# Patient Record
Sex: MALE
[De-identification: names, ages, dates, MRNs, and addresses within clinical notes are randomized per-mention and may not be internally consistent; named-entity substitution may affect disease eponyms.]

---

## 2018-01-20 ENCOUNTER — HOSPITAL ENCOUNTER (EMERGENCY)
Dept: HOSPITAL 90 - EDH | Age: 63
Discharge: ANOTHER HOSPITAL | End: 2018-01-20
Payer: COMMERCIAL

## 2018-01-20 DIAGNOSIS — Z79.4: ICD-10-CM

## 2018-01-20 DIAGNOSIS — Z86.73: ICD-10-CM

## 2018-01-20 DIAGNOSIS — R56.9: Primary | ICD-10-CM

## 2018-01-20 DIAGNOSIS — Z88.1: ICD-10-CM

## 2018-01-20 DIAGNOSIS — E11.9: ICD-10-CM

## 2018-01-20 DIAGNOSIS — Z95.1: ICD-10-CM

## 2018-01-20 DIAGNOSIS — R27.0: ICD-10-CM

## 2018-01-20 DIAGNOSIS — Z95.810: ICD-10-CM

## 2018-01-20 DIAGNOSIS — R53.1: ICD-10-CM

## 2018-01-20 DIAGNOSIS — I25.810: ICD-10-CM

## 2018-01-20 LAB
ALBUMIN SERPL-MCNC: 2.7 G/DL (ref 3.5–5)
ALT SERPL-CCNC: 15 U/L (ref 12–78)
APTT PPP: 30.1 SEC (ref 26.3–35.5)
AST SERPL-CCNC: 23 U/L (ref 10–37)
BASOPHILS NFR BLD AUTO: 0.3 % (ref 0–5)
BILIRUB SERPL-MCNC: 0.4 MG/DL (ref 0.2–1)
BUN SERPL-MCNC: 42 MG/DL (ref 7–18)
CHLORIDE SERPL-SCNC: 96 MMOL/L (ref 101–111)
CK MB SERPL-MCNC: < 0.5 NG/ML (ref 0.5–3.6)
CK SERPL-CCNC: 229 U/L (ref 21–232)
CO2 SERPL-SCNC: 28 MMOL/L (ref 21–32)
CREAT SERPL-MCNC: 2 MG/DL (ref 0.5–1.5)
EOSINOPHIL NFR BLD AUTO: 0 % (ref 0–8)
ERYTHROCYTE [DISTWIDTH] IN BLOOD BY AUTOMATED COUNT: 14.3 % (ref 11–15.5)
GFR SERPL CREATININE-BSD FRML MDRD: 36 ML/MIN (ref 60–?)
GLUCOSE SERPL-MCNC: 137 MG/DL (ref 70–105)
HCT VFR BLD AUTO: 30.8 % (ref 42–54)
INR PPP: 0.96 (ref 0.85–1.15)
LYMPHOCYTES NFR SPEC AUTO: 5.9 % (ref 21–51)
MCH RBC QN AUTO: 31 PG (ref 27–33)
MCHC RBC AUTO-ENTMCNC: 35 G/DL (ref 32–36)
MCV RBC AUTO: 88.5 FL (ref 79–99)
MONOCYTES NFR BLD AUTO: 5.4 % (ref 3–13)
MYOGLOBIN SERPL-MCNC: 209 NG/ML (ref 10–92)
NEUTROPHILS NFR BLD AUTO: 88.4 % (ref 40–77)
NRBC BLD MANUAL-RTO: 0 % (ref 0–0.19)
PLATELET # BLD AUTO: 247 K/UL (ref 130–400)
POTASSIUM SERPL-SCNC: 3.8 MMOL/L (ref 3.5–5.1)
PROT SERPL-MCNC: 7.9 G/DL (ref 6–8.3)
PROTHROMBIN TIME: 10.1 SEC (ref 9.6–11.6)
RBC # BLD AUTO: 3.48 MIL/UL (ref 4.5–6.2)
SODIUM SERPL-SCNC: 133 MMOL/L (ref 136–145)
WBC # BLD AUTO: 17 K/UL (ref 4.8–10.8)

## 2018-01-20 PROCEDURE — 93005 ELECTROCARDIOGRAM TRACING: CPT

## 2018-01-20 PROCEDURE — 36415 COLL VENOUS BLD VENIPUNCTURE: CPT

## 2018-01-20 PROCEDURE — 85025 COMPLETE CBC W/AUTO DIFF WBC: CPT

## 2018-01-20 PROCEDURE — 80053 COMPREHEN METABOLIC PANEL: CPT

## 2018-01-20 PROCEDURE — 83874 ASSAY OF MYOGLOBIN: CPT

## 2018-01-20 PROCEDURE — 82550 ASSAY OF CK (CPK): CPT

## 2018-01-20 PROCEDURE — 85610 PROTHROMBIN TIME: CPT

## 2018-01-20 PROCEDURE — 94761 N-INVAS EAR/PLS OXIMETRY MLT: CPT

## 2018-01-20 PROCEDURE — 82553 CREATINE MB FRACTION: CPT

## 2018-01-20 PROCEDURE — 71045 X-RAY EXAM CHEST 1 VIEW: CPT

## 2018-01-20 PROCEDURE — 84484 ASSAY OF TROPONIN QUANT: CPT

## 2018-01-20 PROCEDURE — 85730 THROMBOPLASTIN TIME PARTIAL: CPT

## 2018-01-20 PROCEDURE — 70450 CT HEAD/BRAIN W/O DYE: CPT

## 2019-02-11 ENCOUNTER — HOSPITAL ENCOUNTER (OUTPATIENT)
Dept: HOSPITAL 90 - OIH | Age: 64
Discharge: HOME | End: 2019-02-11
Attending: FAMILY MEDICINE
Payer: COMMERCIAL

## 2019-02-11 DIAGNOSIS — M25.571: Primary | ICD-10-CM

## 2019-02-11 PROCEDURE — 73610 X-RAY EXAM OF ANKLE: CPT

## 2019-11-20 ENCOUNTER — HOSPITAL ENCOUNTER (OUTPATIENT)
Dept: HOSPITAL 90 - RAH | Age: 64
Discharge: HOME | End: 2019-11-20
Attending: FAMILY MEDICINE
Payer: COMMERCIAL

## 2019-11-20 DIAGNOSIS — Z98.890: ICD-10-CM

## 2019-11-20 DIAGNOSIS — I50.22: Primary | ICD-10-CM

## 2019-11-20 DIAGNOSIS — Z95.0: ICD-10-CM

## 2019-11-20 PROCEDURE — 71046 X-RAY EXAM CHEST 2 VIEWS: CPT

## 2021-04-30 ENCOUNTER — HOSPITAL ENCOUNTER (OUTPATIENT)
Dept: HOSPITAL 90 - RAH | Age: 66
Discharge: HOME | End: 2021-04-30
Attending: INTERNAL MEDICINE
Payer: COMMERCIAL

## 2021-04-30 DIAGNOSIS — I73.9: Primary | ICD-10-CM

## 2021-04-30 DIAGNOSIS — R60.0: ICD-10-CM

## 2021-04-30 PROCEDURE — 85378 FIBRIN DEGRADE SEMIQUANT: CPT

## 2021-04-30 PROCEDURE — 93970 EXTREMITY STUDY: CPT

## 2021-04-30 PROCEDURE — 36415 COLL VENOUS BLD VENIPUNCTURE: CPT

## 2021-06-07 LAB
APTT PPP: 26.2 SEC (ref 26.3–35.5)
BASOPHILS NFR BLD AUTO: 0.5 % (ref 0–5)
BUN SERPL-MCNC: 34 MG/DL (ref 7–18)
CHLORIDE SERPL-SCNC: 108 MMOL/L (ref 101–111)
CO2 SERPL-SCNC: 23 MMOL/L (ref 21–32)
CREAT SERPL-MCNC: 1.4 MG/DL (ref 0.5–1.5)
EOSINOPHIL NFR BLD AUTO: 1.9 % (ref 0–8)
ERYTHROCYTE [DISTWIDTH] IN BLOOD BY AUTOMATED COUNT: 16.6 % (ref 11–15.5)
GFR SERPL CREATININE-BSD FRML MDRD: 54 ML/MIN (ref 60–?)
GLUCOSE SERPL-MCNC: 162 MG/DL (ref 70–105)
HCT VFR BLD AUTO: 31.3 % (ref 42–54)
INR PPP: 1.03 (ref 0.85–1.15)
LYMPHOCYTES NFR SPEC AUTO: 11.2 % (ref 21–51)
MCH RBC QN AUTO: 28.4 PG (ref 27–33)
MCHC RBC AUTO-ENTMCNC: 31 G/DL (ref 32–36)
MCV RBC AUTO: 91.8 FL (ref 79–99)
MONOCYTES NFR BLD AUTO: 6.9 % (ref 3–13)
NEUTROPHILS NFR BLD AUTO: 79.1 % (ref 40–77)
NRBC BLD MANUAL-RTO: 0 % (ref 0–0.19)
PLATELET # BLD AUTO: 360 K/UL (ref 130–400)
POTASSIUM SERPL-SCNC: 4.3 MMOL/L (ref 3.5–5.1)
PROTHROMBIN TIME: 11.2 SEC (ref 9.6–11.6)
RBC # BLD AUTO: 3.41 MIL/UL (ref 4.5–6.2)
RBC MORPH BLD: (no result)
SODIUM SERPL-SCNC: 141 MMOL/L (ref 136–145)
WBC # BLD AUTO: 10.8 K/UL (ref 4.8–10.8)

## 2021-06-08 VITALS — DIASTOLIC BLOOD PRESSURE: 52 MMHG | SYSTOLIC BLOOD PRESSURE: 122 MMHG

## 2021-06-09 ENCOUNTER — HOSPITAL ENCOUNTER (OUTPATIENT)
Dept: HOSPITAL 90 - DAH | Age: 66
Setting detail: OBSERVATION
LOS: 1 days | Discharge: HOME | End: 2021-06-10
Attending: INTERNAL MEDICINE | Admitting: INTERNAL MEDICINE
Payer: COMMERCIAL

## 2021-06-09 VITALS — BODY MASS INDEX: 22.93 KG/M2 | WEIGHT: 146.1 LBS | HEIGHT: 67 IN

## 2021-06-09 VITALS — SYSTOLIC BLOOD PRESSURE: 133 MMHG | DIASTOLIC BLOOD PRESSURE: 51 MMHG

## 2021-06-09 VITALS — SYSTOLIC BLOOD PRESSURE: 152 MMHG | DIASTOLIC BLOOD PRESSURE: 62 MMHG

## 2021-06-09 VITALS — DIASTOLIC BLOOD PRESSURE: 77 MMHG | SYSTOLIC BLOOD PRESSURE: 129 MMHG

## 2021-06-09 VITALS — DIASTOLIC BLOOD PRESSURE: 65 MMHG | SYSTOLIC BLOOD PRESSURE: 141 MMHG

## 2021-06-09 VITALS — SYSTOLIC BLOOD PRESSURE: 163 MMHG | DIASTOLIC BLOOD PRESSURE: 78 MMHG

## 2021-06-09 VITALS — DIASTOLIC BLOOD PRESSURE: 58 MMHG | SYSTOLIC BLOOD PRESSURE: 150 MMHG

## 2021-06-09 VITALS — DIASTOLIC BLOOD PRESSURE: 74 MMHG | SYSTOLIC BLOOD PRESSURE: 147 MMHG

## 2021-06-09 VITALS — SYSTOLIC BLOOD PRESSURE: 146 MMHG | DIASTOLIC BLOOD PRESSURE: 62 MMHG

## 2021-06-09 VITALS — SYSTOLIC BLOOD PRESSURE: 143 MMHG | DIASTOLIC BLOOD PRESSURE: 68 MMHG

## 2021-06-09 VITALS — DIASTOLIC BLOOD PRESSURE: 63 MMHG | SYSTOLIC BLOOD PRESSURE: 145 MMHG

## 2021-06-09 VITALS — SYSTOLIC BLOOD PRESSURE: 140 MMHG | DIASTOLIC BLOOD PRESSURE: 61 MMHG

## 2021-06-09 DIAGNOSIS — I11.0: ICD-10-CM

## 2021-06-09 DIAGNOSIS — Z79.899: ICD-10-CM

## 2021-06-09 DIAGNOSIS — I25.2: ICD-10-CM

## 2021-06-09 DIAGNOSIS — I50.22: ICD-10-CM

## 2021-06-09 DIAGNOSIS — Z89.612: ICD-10-CM

## 2021-06-09 DIAGNOSIS — I25.10: ICD-10-CM

## 2021-06-09 DIAGNOSIS — Z95.5: ICD-10-CM

## 2021-06-09 DIAGNOSIS — Z95.810: ICD-10-CM

## 2021-06-09 DIAGNOSIS — E11.51: ICD-10-CM

## 2021-06-09 DIAGNOSIS — I70.201: Primary | ICD-10-CM

## 2021-06-09 DIAGNOSIS — Z88.5: ICD-10-CM

## 2021-06-09 LAB
DEPRECATED SQUAMOUS URNS QL MICRO: (no result) /HPF (ref 0–2)
PH UR STRIP: 6 [PH] (ref 5–8)
PROT UR QL STRIP: (no result) MG/DL
RBC #/AREA URNS HPF: (no result) /HPF (ref 0–1)
SP GR UR STRIP: 1.02 (ref 1–1.03)
UROBILINOGEN UR STRIP-MCNC: 4 MG/DL (ref 0.2–1)
WBC #/AREA URNS HPF: (no result) /HPF (ref 0–1)

## 2021-06-09 PROCEDURE — 96360 HYDRATION IV INFUSION INIT: CPT

## 2021-06-09 PROCEDURE — 99156 MOD SED OTH PHYS/QHP 5/>YRS: CPT

## 2021-06-09 PROCEDURE — 75710 ARTERY X-RAYS ARM/LEG: CPT

## 2021-06-09 PROCEDURE — 99157 MOD SED OTHER PHYS/QHP EA: CPT

## 2021-06-09 PROCEDURE — 80048 BASIC METABOLIC PNL TOTAL CA: CPT

## 2021-06-09 PROCEDURE — 85610 PROTHROMBIN TIME: CPT

## 2021-06-09 PROCEDURE — 85025 COMPLETE CBC W/AUTO DIFF WBC: CPT

## 2021-06-09 PROCEDURE — 93005 ELECTROCARDIOGRAM TRACING: CPT

## 2021-06-09 PROCEDURE — 85730 THROMBOPLASTIN TIME PARTIAL: CPT

## 2021-06-09 PROCEDURE — 71045 X-RAY EXAM CHEST 1 VIEW: CPT

## 2021-06-09 PROCEDURE — 85027 COMPLETE CBC AUTOMATED: CPT

## 2021-06-09 PROCEDURE — 81001 URINALYSIS AUTO W/SCOPE: CPT

## 2021-06-09 PROCEDURE — 82948 REAGENT STRIP/BLOOD GLUCOSE: CPT

## 2021-06-09 PROCEDURE — 36415 COLL VENOUS BLD VENIPUNCTURE: CPT

## 2021-06-09 PROCEDURE — 36246 INS CATH ABD/L-EXT ART 2ND: CPT

## 2021-06-09 RX ADMIN — SODIUM CHLORIDE SCH UNIT: 9 INJECTION, SOLUTION INTRAVENOUS at 21:20

## 2021-06-09 RX ADMIN — SODIUM CHLORIDE SCH UNIT: 9 INJECTION, SOLUTION INTRAVENOUS at 16:30

## 2021-06-10 VITALS — SYSTOLIC BLOOD PRESSURE: 149 MMHG | DIASTOLIC BLOOD PRESSURE: 74 MMHG

## 2021-06-10 VITALS — DIASTOLIC BLOOD PRESSURE: 66 MMHG | SYSTOLIC BLOOD PRESSURE: 137 MMHG

## 2021-06-10 VITALS — SYSTOLIC BLOOD PRESSURE: 140 MMHG | DIASTOLIC BLOOD PRESSURE: 59 MMHG

## 2021-06-10 LAB
BUN SERPL-MCNC: 18 MG/DL (ref 7–18)
CHLORIDE SERPL-SCNC: 107 MMOL/L (ref 101–111)
CO2 SERPL-SCNC: 23 MMOL/L (ref 21–32)
CREAT SERPL-MCNC: 1 MG/DL (ref 0.5–1.5)
ERYTHROCYTE [DISTWIDTH] IN BLOOD BY AUTOMATED COUNT: 15.9 % (ref 11–15.5)
GFR SERPL CREATININE-BSD FRML MDRD: 79 ML/MIN (ref 60–?)
GLUCOSE SERPL-MCNC: 131 MG/DL (ref 70–105)
HCT VFR BLD AUTO: 30.5 % (ref 42–54)
MCH RBC QN AUTO: 28.5 PG (ref 27–33)
MCHC RBC AUTO-ENTMCNC: 31.5 G/DL (ref 32–36)
MCV RBC AUTO: 90.5 FL (ref 79–99)
NRBC BLD MANUAL-RTO: 0 % (ref 0–0.19)
PLATELET # BLD AUTO: 281 K/UL (ref 130–400)
POTASSIUM SERPL-SCNC: 3.5 MMOL/L (ref 3.5–5.1)
RBC # BLD AUTO: 3.37 MIL/UL (ref 4.5–6.2)
SODIUM SERPL-SCNC: 141 MMOL/L (ref 136–145)
WBC # BLD AUTO: 9.8 K/UL (ref 4.8–10.8)

## 2021-06-10 RX ADMIN — SODIUM CHLORIDE SCH UNIT: 9 INJECTION, SOLUTION INTRAVENOUS at 05:40

## 2021-06-12 ENCOUNTER — HOSPITAL ENCOUNTER (INPATIENT)
Dept: HOSPITAL 90 - EDH | Age: 66
LOS: 8 days | Discharge: NURSING, REHAB OR LONG-TERM CARE | DRG: 252 | End: 2021-06-20
Attending: INTERNAL MEDICINE | Admitting: INTERNAL MEDICINE
Payer: COMMERCIAL

## 2021-06-12 VITALS — SYSTOLIC BLOOD PRESSURE: 170 MMHG | DIASTOLIC BLOOD PRESSURE: 72 MMHG

## 2021-06-12 VITALS — SYSTOLIC BLOOD PRESSURE: 127 MMHG | DIASTOLIC BLOOD PRESSURE: 68 MMHG

## 2021-06-12 VITALS — SYSTOLIC BLOOD PRESSURE: 147 MMHG | DIASTOLIC BLOOD PRESSURE: 61 MMHG

## 2021-06-12 VITALS — BODY MASS INDEX: 24.45 KG/M2 | WEIGHT: 155.8 LBS | HEIGHT: 67 IN

## 2021-06-12 VITALS — SYSTOLIC BLOOD PRESSURE: 145 MMHG | DIASTOLIC BLOOD PRESSURE: 55 MMHG

## 2021-06-12 DIAGNOSIS — I10: ICD-10-CM
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
## 2021-06-12 DIAGNOSIS — Z95.810: ICD-10-CM

## 2021-06-12 DIAGNOSIS — J95.2: ICD-10-CM

## 2021-06-12 DIAGNOSIS — Z95.1: ICD-10-CM
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
## 2021-06-12 DIAGNOSIS — S80.811A: ICD-10-CM
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
## 2021-06-12 DIAGNOSIS — S09.90XA: ICD-10-CM
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
## 2021-06-12 DIAGNOSIS — K21.9: ICD-10-CM

## 2021-06-12 DIAGNOSIS — L97.509: ICD-10-CM

## 2021-06-12 DIAGNOSIS — Z88.8: ICD-10-CM

## 2021-06-12 DIAGNOSIS — N28.89: ICD-10-CM

## 2021-06-12 DIAGNOSIS — Z79.01: ICD-10-CM
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
## 2021-06-12 DIAGNOSIS — I25.10: ICD-10-CM

## 2021-06-12 DIAGNOSIS — M62.82: ICD-10-CM

## 2021-06-12 DIAGNOSIS — I25.5: ICD-10-CM

## 2021-06-12 DIAGNOSIS — Z89.612: ICD-10-CM

## 2021-06-12 DIAGNOSIS — E78.00: ICD-10-CM
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
## 2021-06-12 DIAGNOSIS — J44.1: ICD-10-CM
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
## 2021-06-12 DIAGNOSIS — Z79.899: ICD-10-CM

## 2021-06-12 DIAGNOSIS — S72.002A: ICD-10-CM

## 2021-06-12 DIAGNOSIS — Z87.891: ICD-10-CM

## 2021-06-12 DIAGNOSIS — I70.201: ICD-10-CM

## 2021-06-12 DIAGNOSIS — Z88.5: ICD-10-CM

## 2021-06-12 DIAGNOSIS — E11.621: ICD-10-CM

## 2021-06-12 DIAGNOSIS — E78.5: ICD-10-CM

## 2021-06-12 DIAGNOSIS — Z79.82: ICD-10-CM

## 2021-06-12 DIAGNOSIS — R53.81: ICD-10-CM

## 2021-06-12 DIAGNOSIS — Z20.822: ICD-10-CM

## 2021-06-12 DIAGNOSIS — E11.51: Primary | ICD-10-CM

## 2021-06-12 DIAGNOSIS — Z79.02: ICD-10-CM

## 2021-06-12 DIAGNOSIS — S00.12XA: ICD-10-CM

## 2021-06-12 LAB
ALBUMIN SERPL-MCNC: 2.6 G/DL (ref 3.5–5)
ALT SERPL-CCNC: 27 U/L (ref 12–78)
AMPHETAMINES UR QL SCN: NEGATIVE
AST SERPL-CCNC: 38 U/L (ref 10–37)
BARBITURATES UR QL SCN: NEGATIVE
BASOPHILS NFR BLD AUTO: 0.4 % (ref 0–5)
BENZODIAZ UR QL SCN: NEGATIVE
BILIRUB SERPL-MCNC: 0.4 MG/DL (ref 0.2–1)
BNP SERPL-MCNC: 345 PG/ML (ref 0–100)
BUN SERPL-MCNC: 27 MG/DL (ref 7–18)
BZE UR QL SCN: NEGATIVE
CANNABINOIDS UR QL SCN: POSITIVE
CHLORIDE SERPL-SCNC: 104 MMOL/L (ref 101–111)
CK SERPL-CCNC: 578 U/L (ref 21–232)
CO2 SERPL-SCNC: 24 MMOL/L (ref 21–32)
CREAT SERPL-MCNC: 1.2 MG/DL (ref 0.5–1.5)
EOSINOPHIL NFR BLD AUTO: 1 % (ref 0–8)
ERYTHROCYTE [DISTWIDTH] IN BLOOD BY AUTOMATED COUNT: 15.8 % (ref 11–15.5)
GFR SERPL CREATININE-BSD FRML MDRD: 64 ML/MIN (ref 60–?)
GLUCOSE SERPL-MCNC: 197 MG/DL (ref 70–105)
HCT VFR BLD AUTO: 34.5 % (ref 42–54)
INR PPP: 1.01 (ref 0.85–1.15)
LYMPHOCYTES NFR SPEC AUTO: 7.5 % (ref 21–51)
MCH RBC QN AUTO: 29 PG (ref 27–33)
MCHC RBC AUTO-ENTMCNC: 31.3 G/DL (ref 32–36)
MCV RBC AUTO: 92.5 FL (ref 79–99)
MONOCYTES NFR BLD AUTO: 6.6 % (ref 3–13)
MYOGLOBIN SERPL-MCNC: 450 NG/ML (ref 10–92)
NEUTROPHILS NFR BLD AUTO: 84.1 % (ref 40–77)
NRBC BLD MANUAL-RTO: 0 % (ref 0–0.19)
OPIATES UR QL SCN: NEGATIVE
PCP UR QL SCN: NEGATIVE
PH UR STRIP: 5.5 [PH] (ref 5–8)
PLATELET # BLD AUTO: 329 K/UL (ref 130–400)
POTASSIUM SERPL-SCNC: 3.7 MMOL/L (ref 3.5–5.1)
PROT SERPL-MCNC: 7.5 G/DL (ref 6–8.3)
PROT UR QL STRIP: (no result) MG/DL
PROTHROMBIN TIME: 11 SEC (ref 9.6–11.6)
RBC # BLD AUTO: 3.73 MIL/UL (ref 4.5–6.2)
SODIUM SERPL-SCNC: 142 MMOL/L (ref 136–145)
SP GR UR STRIP: 1.03 (ref 1–1.03)
TROPONIN I SERPL-MCNC: < 0.04 NG/ML (ref 0–0.06)
UROBILINOGEN UR STRIP-MCNC: 1 MG/DL (ref 0.2–1)
WBC # BLD AUTO: 14.3 K/UL (ref 4.8–10.8)

## 2021-06-12 PROCEDURE — 87635 SARS-COV-2 COVID-19 AMP PRB: CPT

## 2021-06-12 PROCEDURE — 85027 COMPLETE CBC AUTOMATED: CPT

## 2021-06-12 PROCEDURE — 83874 ASSAY OF MYOGLOBIN: CPT

## 2021-06-12 PROCEDURE — 96360 HYDRATION IV INFUSION INIT: CPT

## 2021-06-12 PROCEDURE — 85025 COMPLETE CBC W/AUTO DIFF WBC: CPT

## 2021-06-12 PROCEDURE — 94640 AIRWAY INHALATION TREATMENT: CPT

## 2021-06-12 PROCEDURE — 83880 ASSAY OF NATRIURETIC PEPTIDE: CPT

## 2021-06-12 PROCEDURE — 85730 THROMBOPLASTIN TIME PARTIAL: CPT

## 2021-06-12 PROCEDURE — 70450 CT HEAD/BRAIN W/O DYE: CPT

## 2021-06-12 PROCEDURE — 85610 PROTHROMBIN TIME: CPT

## 2021-06-12 PROCEDURE — 97039 UNLISTED MODALITY: CPT

## 2021-06-12 PROCEDURE — 86901 BLOOD TYPING SEROLOGIC RH(D): CPT

## 2021-06-12 PROCEDURE — 92526 ORAL FUNCTION THERAPY: CPT

## 2021-06-12 PROCEDURE — 80305 DRUG TEST PRSMV DIR OPT OBS: CPT

## 2021-06-12 PROCEDURE — 86850 RBC ANTIBODY SCREEN: CPT

## 2021-06-12 PROCEDURE — 74177 CT ABD & PELVIS W/CONTRAST: CPT

## 2021-06-12 PROCEDURE — 36246 INS CATH ABD/L-EXT ART 2ND: CPT

## 2021-06-12 PROCEDURE — 86900 BLOOD TYPING SEROLOGIC ABO: CPT

## 2021-06-12 PROCEDURE — 82550 ASSAY OF CK (CPK): CPT

## 2021-06-12 PROCEDURE — 72125 CT NECK SPINE W/O DYE: CPT

## 2021-06-12 PROCEDURE — 73590 X-RAY EXAM OF LOWER LEG: CPT

## 2021-06-12 PROCEDURE — 80053 COMPREHEN METABOLIC PANEL: CPT

## 2021-06-12 PROCEDURE — 81001 URINALYSIS AUTO W/SCOPE: CPT

## 2021-06-12 PROCEDURE — 93005 ELECTROCARDIOGRAM TRACING: CPT

## 2021-06-12 PROCEDURE — 90715 TDAP VACCINE 7 YRS/> IM: CPT

## 2021-06-12 PROCEDURE — 73630 X-RAY EXAM OF FOOT: CPT

## 2021-06-12 PROCEDURE — 30233R1 TRANSFUSION OF NONAUTOLOGOUS PLATELETS INTO PERIPHERAL VEIN, PERCUTANEOUS APPROACH: ICD-10-PCS | Performed by: INTERNAL MEDICINE

## 2021-06-12 PROCEDURE — 80048 BASIC METABOLIC PNL TOTAL CA: CPT

## 2021-06-12 PROCEDURE — 87040 BLOOD CULTURE FOR BACTERIA: CPT

## 2021-06-12 PROCEDURE — 94664 DEMO&/EVAL PT USE INHALER: CPT

## 2021-06-12 PROCEDURE — 83605 ASSAY OF LACTIC ACID: CPT

## 2021-06-12 PROCEDURE — 99157 MOD SED OTHER PHYS/QHP EA: CPT

## 2021-06-12 PROCEDURE — 75710 ARTERY X-RAYS ARM/LEG: CPT

## 2021-06-12 PROCEDURE — 84484 ASSAY OF TROPONIN QUANT: CPT

## 2021-06-12 PROCEDURE — 83735 ASSAY OF MAGNESIUM: CPT

## 2021-06-12 PROCEDURE — 73552 X-RAY EXAM OF FEMUR 2/>: CPT

## 2021-06-12 PROCEDURE — 86923 COMPATIBILITY TEST ELECTRIC: CPT

## 2021-06-12 PROCEDURE — 85378 FIBRIN DEGRADE SEMIQUANT: CPT

## 2021-06-12 PROCEDURE — 36415 COLL VENOUS BLD VENIPUNCTURE: CPT

## 2021-06-12 PROCEDURE — 84145 PROCALCITONIN (PCT): CPT

## 2021-06-12 PROCEDURE — 71045 X-RAY EXAM CHEST 1 VIEW: CPT

## 2021-06-12 PROCEDURE — 87804 INFLUENZA ASSAY W/OPTIC: CPT

## 2021-06-12 PROCEDURE — 71260 CT THORAX DX C+: CPT

## 2021-06-12 PROCEDURE — 99156 MOD SED OTH PHYS/QHP 5/>YRS: CPT

## 2021-06-12 PROCEDURE — 82948 REAGENT STRIP/BLOOD GLUCOSE: CPT

## 2021-06-12 PROCEDURE — 92610 EVALUATE SWALLOWING FUNCTION: CPT

## 2021-06-12 RX ADMIN — Medication SCH MLS/HR: at 16:21

## 2021-06-12 RX ADMIN — SODIUM CHLORIDE SCH UNIT: 9 INJECTION, SOLUTION INTRAVENOUS at 18:00

## 2021-06-13 VITALS — DIASTOLIC BLOOD PRESSURE: 63 MMHG | SYSTOLIC BLOOD PRESSURE: 161 MMHG

## 2021-06-13 VITALS — DIASTOLIC BLOOD PRESSURE: 41 MMHG | SYSTOLIC BLOOD PRESSURE: 111 MMHG

## 2021-06-13 VITALS — DIASTOLIC BLOOD PRESSURE: 65 MMHG | SYSTOLIC BLOOD PRESSURE: 165 MMHG

## 2021-06-13 VITALS — SYSTOLIC BLOOD PRESSURE: 148 MMHG | DIASTOLIC BLOOD PRESSURE: 75 MMHG

## 2021-06-13 VITALS — SYSTOLIC BLOOD PRESSURE: 159 MMHG | DIASTOLIC BLOOD PRESSURE: 65 MMHG

## 2021-06-13 VITALS — DIASTOLIC BLOOD PRESSURE: 69 MMHG | SYSTOLIC BLOOD PRESSURE: 154 MMHG

## 2021-06-13 VITALS — SYSTOLIC BLOOD PRESSURE: 142 MMHG | DIASTOLIC BLOOD PRESSURE: 55 MMHG

## 2021-06-13 LAB
BUN SERPL-MCNC: 15 MG/DL (ref 7–18)
CHLORIDE SERPL-SCNC: 107 MMOL/L (ref 101–111)
CK SERPL-CCNC: 474 U/L (ref 21–232)
CO2 SERPL-SCNC: 26 MMOL/L (ref 21–32)
CREAT SERPL-MCNC: 0.9 MG/DL (ref 0.5–1.5)
ERYTHROCYTE [DISTWIDTH] IN BLOOD BY AUTOMATED COUNT: 15.9 % (ref 11–15.5)
GFR SERPL CREATININE-BSD FRML MDRD: 89 ML/MIN (ref 60–?)
GLUCOSE SERPL-MCNC: 129 MG/DL (ref 70–105)
HCT VFR BLD AUTO: 31.2 % (ref 42–54)
MAGNESIUM SERPL-MCNC: 1.4 MG/DL (ref 1.8–2.4)
MCH RBC QN AUTO: 28.9 PG (ref 27–33)
MCHC RBC AUTO-ENTMCNC: 31.7 G/DL (ref 32–36)
MCV RBC AUTO: 91 FL (ref 79–99)
NRBC BLD MANUAL-RTO: 0 % (ref 0–0.19)
PLATELET # BLD AUTO: 251 K/UL (ref 130–400)
POTASSIUM SERPL-SCNC: 3.7 MMOL/L (ref 3.5–5.1)
RBC # BLD AUTO: 3.43 MIL/UL (ref 4.5–6.2)
SODIUM SERPL-SCNC: 142 MMOL/L (ref 136–145)
WBC # BLD AUTO: 10.7 K/UL (ref 4.8–10.8)

## 2021-06-13 RX ADMIN — SODIUM CHLORIDE SCH UNIT: 9 INJECTION, SOLUTION INTRAVENOUS at 12:00

## 2021-06-13 RX ADMIN — SODIUM CHLORIDE SCH UNIT: 9 INJECTION, SOLUTION INTRAVENOUS at 00:00

## 2021-06-13 RX ADMIN — SODIUM CHLORIDE SCH UNIT: 9 INJECTION, SOLUTION INTRAVENOUS at 17:33

## 2021-06-13 RX ADMIN — GUAIFENESIN SCH MG: 600 TABLET, EXTENDED RELEASE ORAL at 21:17

## 2021-06-13 RX ADMIN — SODIUM CHLORIDE SCH UNIT: 9 INJECTION, SOLUTION INTRAVENOUS at 06:00

## 2021-06-13 RX ADMIN — Medication SCH MLS/HR: at 18:08

## 2021-06-13 RX ADMIN — SODIUM CHLORIDE SCH MG: 9 INJECTION, SOLUTION INTRAVENOUS at 09:24

## 2021-06-13 RX ADMIN — Medication SCH MLS/HR: at 04:41

## 2021-06-13 RX ADMIN — NITROGLYCERIN SCH INCH: 20 OINTMENT TOPICAL at 18:11

## 2021-06-14 VITALS — SYSTOLIC BLOOD PRESSURE: 140 MMHG | DIASTOLIC BLOOD PRESSURE: 50 MMHG

## 2021-06-14 VITALS — SYSTOLIC BLOOD PRESSURE: 119 MMHG | DIASTOLIC BLOOD PRESSURE: 56 MMHG

## 2021-06-14 VITALS — SYSTOLIC BLOOD PRESSURE: 145 MMHG | DIASTOLIC BLOOD PRESSURE: 61 MMHG

## 2021-06-14 VITALS — SYSTOLIC BLOOD PRESSURE: 161 MMHG | DIASTOLIC BLOOD PRESSURE: 71 MMHG

## 2021-06-14 VITALS — SYSTOLIC BLOOD PRESSURE: 186 MMHG | DIASTOLIC BLOOD PRESSURE: 76 MMHG

## 2021-06-14 VITALS — DIASTOLIC BLOOD PRESSURE: 56 MMHG | SYSTOLIC BLOOD PRESSURE: 124 MMHG

## 2021-06-14 VITALS — DIASTOLIC BLOOD PRESSURE: 75 MMHG | SYSTOLIC BLOOD PRESSURE: 147 MMHG

## 2021-06-14 VITALS — DIASTOLIC BLOOD PRESSURE: 59 MMHG | SYSTOLIC BLOOD PRESSURE: 157 MMHG

## 2021-06-14 VITALS — SYSTOLIC BLOOD PRESSURE: 128 MMHG | DIASTOLIC BLOOD PRESSURE: 41 MMHG

## 2021-06-14 VITALS — DIASTOLIC BLOOD PRESSURE: 53 MMHG | SYSTOLIC BLOOD PRESSURE: 134 MMHG

## 2021-06-14 VITALS — SYSTOLIC BLOOD PRESSURE: 144 MMHG | DIASTOLIC BLOOD PRESSURE: 65 MMHG

## 2021-06-14 LAB
BUN SERPL-MCNC: 11 MG/DL (ref 7–18)
CHLORIDE SERPL-SCNC: 106 MMOL/L (ref 101–111)
CK SERPL-CCNC: 564 U/L (ref 21–232)
CO2 SERPL-SCNC: 25 MMOL/L (ref 21–32)
CREAT SERPL-MCNC: 0.9 MG/DL (ref 0.5–1.5)
ERYTHROCYTE [DISTWIDTH] IN BLOOD BY AUTOMATED COUNT: 15.4 % (ref 11–15.5)
GFR SERPL CREATININE-BSD FRML MDRD: 89 ML/MIN (ref 60–?)
GLUCOSE SERPL-MCNC: 122 MG/DL (ref 70–105)
HCT VFR BLD AUTO: 30.8 % (ref 42–54)
MAGNESIUM SERPL-MCNC: 1.3 MG/DL (ref 1.8–2.4)
MCH RBC QN AUTO: 28.9 PG (ref 27–33)
MCHC RBC AUTO-ENTMCNC: 31.8 G/DL (ref 32–36)
MCV RBC AUTO: 90.9 FL (ref 79–99)
NRBC BLD MANUAL-RTO: 0 % (ref 0–0.19)
PLATELET # BLD AUTO: 244 K/UL (ref 130–400)
POTASSIUM SERPL-SCNC: 3.8 MMOL/L (ref 3.5–5.1)
RBC # BLD AUTO: 3.39 MIL/UL (ref 4.5–6.2)
SODIUM SERPL-SCNC: 141 MMOL/L (ref 136–145)
WBC # BLD AUTO: 10.6 K/UL (ref 4.8–10.8)

## 2021-06-14 RX ADMIN — NITROGLYCERIN SCH INCH: 20 OINTMENT TOPICAL at 08:18

## 2021-06-14 RX ADMIN — ATORVASTATIN CALCIUM SCH MG: 40 TABLET, FILM COATED ORAL at 21:08

## 2021-06-14 RX ADMIN — NITROGLYCERIN SCH INCH: 20 OINTMENT TOPICAL at 17:30

## 2021-06-14 RX ADMIN — AMIODARONE HYDROCHLORIDE SCH MG: 200 TABLET ORAL at 09:26

## 2021-06-14 RX ADMIN — SODIUM CHLORIDE SCH UNIT: 9 INJECTION, SOLUTION INTRAVENOUS at 05:49

## 2021-06-14 RX ADMIN — CARVEDILOL SCH MG: 3.12 TABLET, FILM COATED ORAL at 21:28

## 2021-06-14 RX ADMIN — SACUBITRIL AND VALSARTAN SCH EACH: 49; 51 TABLET, FILM COATED ORAL at 09:26

## 2021-06-14 RX ADMIN — Medication SCH MLS/HR: at 07:15

## 2021-06-14 RX ADMIN — GABAPENTIN SCH MG: 300 CAPSULE ORAL at 21:08

## 2021-06-14 RX ADMIN — NITROGLYCERIN SCH INCH: 20 OINTMENT TOPICAL at 01:12

## 2021-06-14 RX ADMIN — Medication SCH MLS/HR: at 22:29

## 2021-06-14 RX ADMIN — GUAIFENESIN SCH MG: 600 TABLET, EXTENDED RELEASE ORAL at 08:43

## 2021-06-14 RX ADMIN — CARVEDILOL SCH MG: 3.12 TABLET, FILM COATED ORAL at 09:23

## 2021-06-14 RX ADMIN — GABAPENTIN SCH MG: 300 CAPSULE ORAL at 09:26

## 2021-06-14 RX ADMIN — SODIUM CHLORIDE SCH UNIT: 9 INJECTION, SOLUTION INTRAVENOUS at 00:00

## 2021-06-14 RX ADMIN — ASPIRIN SCH MG: 81 TABLET, CHEWABLE ORAL at 09:23

## 2021-06-14 RX ADMIN — SACUBITRIL AND VALSARTAN SCH EACH: 49; 51 TABLET, FILM COATED ORAL at 21:08

## 2021-06-14 RX ADMIN — GUAIFENESIN SCH MG: 600 TABLET, EXTENDED RELEASE ORAL at 21:08

## 2021-06-14 RX ADMIN — SODIUM CHLORIDE SCH UNIT: 9 INJECTION, SOLUTION INTRAVENOUS at 12:03

## 2021-06-14 RX ADMIN — CLOPIDOGREL BISULFATE SCH MG: 75 TABLET, FILM COATED ORAL at 09:26

## 2021-06-14 RX ADMIN — Medication SCH MG: at 09:26

## 2021-06-14 RX ADMIN — SODIUM CHLORIDE SCH UNIT: 9 INJECTION, SOLUTION INTRAVENOUS at 17:39

## 2021-06-14 RX ADMIN — SODIUM CHLORIDE SCH MG: 9 INJECTION, SOLUTION INTRAVENOUS at 08:19

## 2021-06-14 RX ADMIN — MAGNESIUM OXIDE TAB 400 MG (241.3 MG ELEMENTAL MG) SCH MG: 400 (241.3 MG) TAB at 09:26

## 2021-06-15 VITALS — DIASTOLIC BLOOD PRESSURE: 65 MMHG | SYSTOLIC BLOOD PRESSURE: 145 MMHG

## 2021-06-15 VITALS — SYSTOLIC BLOOD PRESSURE: 180 MMHG | DIASTOLIC BLOOD PRESSURE: 67 MMHG

## 2021-06-15 VITALS — SYSTOLIC BLOOD PRESSURE: 148 MMHG | DIASTOLIC BLOOD PRESSURE: 60 MMHG

## 2021-06-15 VITALS — SYSTOLIC BLOOD PRESSURE: 141 MMHG | DIASTOLIC BLOOD PRESSURE: 64 MMHG

## 2021-06-15 VITALS — SYSTOLIC BLOOD PRESSURE: 146 MMHG | DIASTOLIC BLOOD PRESSURE: 58 MMHG

## 2021-06-15 LAB
BUN SERPL-MCNC: 11 MG/DL (ref 7–18)
CHLORIDE SERPL-SCNC: 105 MMOL/L (ref 101–111)
CK SERPL-CCNC: 342 U/L (ref 21–232)
CO2 SERPL-SCNC: 25 MMOL/L (ref 21–32)
CREAT SERPL-MCNC: 1 MG/DL (ref 0.5–1.5)
ERYTHROCYTE [DISTWIDTH] IN BLOOD BY AUTOMATED COUNT: 15.1 % (ref 11–15.5)
GFR SERPL CREATININE-BSD FRML MDRD: 79 ML/MIN (ref 60–?)
GLUCOSE SERPL-MCNC: 159 MG/DL (ref 70–105)
HCT VFR BLD AUTO: 29.4 % (ref 42–54)
MCH RBC QN AUTO: 28 PG (ref 27–33)
MCHC RBC AUTO-ENTMCNC: 31.3 G/DL (ref 32–36)
MCV RBC AUTO: 89.4 FL (ref 79–99)
NRBC BLD MANUAL-RTO: 0 % (ref 0–0.19)
PLATELET # BLD AUTO: 245 K/UL (ref 130–400)
POTASSIUM SERPL-SCNC: 3.7 MMOL/L (ref 3.5–5.1)
RBC # BLD AUTO: 3.29 MIL/UL (ref 4.5–6.2)
SODIUM SERPL-SCNC: 140 MMOL/L (ref 136–145)
WBC # BLD AUTO: 9.9 K/UL (ref 4.8–10.8)

## 2021-06-15 RX ADMIN — Medication SCH MLS/HR: at 08:27

## 2021-06-15 RX ADMIN — AMIODARONE HYDROCHLORIDE SCH MG: 200 TABLET ORAL at 08:23

## 2021-06-15 RX ADMIN — NITROGLYCERIN SCH INCH: 20 OINTMENT TOPICAL at 01:37

## 2021-06-15 RX ADMIN — SODIUM CHLORIDE SCH UNIT: 9 INJECTION, SOLUTION INTRAVENOUS at 11:46

## 2021-06-15 RX ADMIN — CARVEDILOL SCH MG: 3.12 TABLET, FILM COATED ORAL at 08:23

## 2021-06-15 RX ADMIN — SODIUM CHLORIDE SCH MG: 9 INJECTION, SOLUTION INTRAVENOUS at 08:23

## 2021-06-15 RX ADMIN — GUAIFENESIN SCH MG: 600 TABLET, EXTENDED RELEASE ORAL at 20:32

## 2021-06-15 RX ADMIN — SACUBITRIL AND VALSARTAN SCH EACH: 49; 51 TABLET, FILM COATED ORAL at 17:30

## 2021-06-15 RX ADMIN — CLOPIDOGREL BISULFATE SCH MG: 75 TABLET, FILM COATED ORAL at 08:23

## 2021-06-15 RX ADMIN — SODIUM CHLORIDE SCH UNIT: 9 INJECTION, SOLUTION INTRAVENOUS at 20:36

## 2021-06-15 RX ADMIN — SODIUM CHLORIDE SCH UNIT: 9 INJECTION, SOLUTION INTRAVENOUS at 05:26

## 2021-06-15 RX ADMIN — NITROGLYCERIN SCH INCH: 20 OINTMENT TOPICAL at 16:53

## 2021-06-15 RX ADMIN — SODIUM CHLORIDE SCH UNIT: 9 INJECTION, SOLUTION INTRAVENOUS at 00:15

## 2021-06-15 RX ADMIN — ATORVASTATIN CALCIUM SCH MG: 40 TABLET, FILM COATED ORAL at 20:31

## 2021-06-15 RX ADMIN — NITROGLYCERIN SCH INCH: 20 OINTMENT TOPICAL at 08:26

## 2021-06-15 RX ADMIN — Medication SCH MG: at 08:22

## 2021-06-15 RX ADMIN — GUAIFENESIN SCH MG: 600 TABLET, EXTENDED RELEASE ORAL at 08:23

## 2021-06-15 RX ADMIN — IPRATROPIUM BROMIDE SCH MG: 0.5 SOLUTION RESPIRATORY (INHALATION) at 00:45

## 2021-06-15 RX ADMIN — ASPIRIN SCH MG: 81 TABLET, CHEWABLE ORAL at 08:23

## 2021-06-15 RX ADMIN — SODIUM CHLORIDE SCH UNIT: 9 INJECTION, SOLUTION INTRAVENOUS at 16:15

## 2021-06-15 RX ADMIN — GABAPENTIN SCH MG: 300 CAPSULE ORAL at 08:23

## 2021-06-15 RX ADMIN — SACUBITRIL AND VALSARTAN SCH EACH: 49; 51 TABLET, FILM COATED ORAL at 20:32

## 2021-06-15 RX ADMIN — CARVEDILOL SCH MG: 3.12 TABLET, FILM COATED ORAL at 20:32

## 2021-06-15 RX ADMIN — GABAPENTIN SCH MG: 300 CAPSULE ORAL at 20:31

## 2021-06-15 RX ADMIN — MAGNESIUM OXIDE TAB 400 MG (241.3 MG ELEMENTAL MG) SCH MG: 400 (241.3 MG) TAB at 08:22

## 2021-06-16 VITALS — DIASTOLIC BLOOD PRESSURE: 82 MMHG | SYSTOLIC BLOOD PRESSURE: 148 MMHG

## 2021-06-16 VITALS — SYSTOLIC BLOOD PRESSURE: 175 MMHG | DIASTOLIC BLOOD PRESSURE: 54 MMHG

## 2021-06-16 VITALS — SYSTOLIC BLOOD PRESSURE: 161 MMHG | DIASTOLIC BLOOD PRESSURE: 55 MMHG

## 2021-06-16 VITALS — DIASTOLIC BLOOD PRESSURE: 96 MMHG | SYSTOLIC BLOOD PRESSURE: 123 MMHG

## 2021-06-16 VITALS — DIASTOLIC BLOOD PRESSURE: 79 MMHG | SYSTOLIC BLOOD PRESSURE: 148 MMHG

## 2021-06-16 VITALS — SYSTOLIC BLOOD PRESSURE: 139 MMHG | DIASTOLIC BLOOD PRESSURE: 45 MMHG

## 2021-06-16 VITALS — SYSTOLIC BLOOD PRESSURE: 171 MMHG | DIASTOLIC BLOOD PRESSURE: 52 MMHG

## 2021-06-16 VITALS — DIASTOLIC BLOOD PRESSURE: 58 MMHG | SYSTOLIC BLOOD PRESSURE: 182 MMHG

## 2021-06-16 VITALS — DIASTOLIC BLOOD PRESSURE: 61 MMHG | SYSTOLIC BLOOD PRESSURE: 194 MMHG

## 2021-06-16 VITALS — DIASTOLIC BLOOD PRESSURE: 42 MMHG | SYSTOLIC BLOOD PRESSURE: 129 MMHG

## 2021-06-16 VITALS — SYSTOLIC BLOOD PRESSURE: 157 MMHG | DIASTOLIC BLOOD PRESSURE: 56 MMHG

## 2021-06-16 VITALS — SYSTOLIC BLOOD PRESSURE: 151 MMHG | DIASTOLIC BLOOD PRESSURE: 51 MMHG

## 2021-06-16 VITALS — SYSTOLIC BLOOD PRESSURE: 174 MMHG | DIASTOLIC BLOOD PRESSURE: 57 MMHG

## 2021-06-16 VITALS — SYSTOLIC BLOOD PRESSURE: 143 MMHG | DIASTOLIC BLOOD PRESSURE: 49 MMHG

## 2021-06-16 VITALS — SYSTOLIC BLOOD PRESSURE: 171 MMHG | DIASTOLIC BLOOD PRESSURE: 73 MMHG

## 2021-06-16 VITALS — DIASTOLIC BLOOD PRESSURE: 51 MMHG | SYSTOLIC BLOOD PRESSURE: 161 MMHG

## 2021-06-16 VITALS — SYSTOLIC BLOOD PRESSURE: 169 MMHG | DIASTOLIC BLOOD PRESSURE: 53 MMHG

## 2021-06-16 VITALS — DIASTOLIC BLOOD PRESSURE: 68 MMHG | SYSTOLIC BLOOD PRESSURE: 158 MMHG

## 2021-06-16 VITALS — DIASTOLIC BLOOD PRESSURE: 44 MMHG | SYSTOLIC BLOOD PRESSURE: 140 MMHG

## 2021-06-16 VITALS — DIASTOLIC BLOOD PRESSURE: 60 MMHG | SYSTOLIC BLOOD PRESSURE: 172 MMHG

## 2021-06-16 VITALS — SYSTOLIC BLOOD PRESSURE: 175 MMHG | DIASTOLIC BLOOD PRESSURE: 53 MMHG

## 2021-06-16 LAB
APTT PPP: 27.9 SEC (ref 26.3–35.5)
BUN SERPL-MCNC: 13 MG/DL (ref 7–18)
CHLORIDE SERPL-SCNC: 105 MMOL/L (ref 101–111)
CO2 SERPL-SCNC: 27 MMOL/L (ref 21–32)
CREAT SERPL-MCNC: 1 MG/DL (ref 0.5–1.5)
ERYTHROCYTE [DISTWIDTH] IN BLOOD BY AUTOMATED COUNT: 14.9 % (ref 11–15.5)
GFR SERPL CREATININE-BSD FRML MDRD: 79 ML/MIN (ref 60–?)
GLUCOSE SERPL-MCNC: 151 MG/DL (ref 70–105)
HCT VFR BLD AUTO: 29.1 % (ref 42–54)
INR PPP: 1.06 (ref 0.85–1.15)
MCH RBC QN AUTO: 28.8 PG (ref 27–33)
MCHC RBC AUTO-ENTMCNC: 31.6 G/DL (ref 32–36)
MCV RBC AUTO: 91.2 FL (ref 79–99)
NRBC BLD MANUAL-RTO: 0 % (ref 0–0.19)
PLATELET # BLD AUTO: 239 K/UL (ref 130–400)
POTASSIUM SERPL-SCNC: 3.5 MMOL/L (ref 3.5–5.1)
PROTHROMBIN TIME: 11.5 SEC (ref 9.6–11.6)
RBC # BLD AUTO: 3.19 MIL/UL (ref 4.5–6.2)
SODIUM SERPL-SCNC: 141 MMOL/L (ref 136–145)
WBC # BLD AUTO: 10.3 K/UL (ref 4.8–10.8)

## 2021-06-16 PROCEDURE — 041K0KN BYPASS RIGHT FEMORAL ARTERY TO POSTERIOR TIBIAL ARTERY WITH NONAUTOLOGOUS TISSUE SUBSTITUTE, OPEN APPROACH: ICD-10-PCS | Performed by: THORACIC SURGERY (CARDIOTHORACIC VASCULAR SURGERY)

## 2021-06-16 RX ADMIN — GABAPENTIN SCH MG: 300 CAPSULE ORAL at 20:30

## 2021-06-16 RX ADMIN — GABAPENTIN SCH MG: 300 CAPSULE ORAL at 13:41

## 2021-06-16 RX ADMIN — NITROGLYCERIN SCH INCH: 20 OINTMENT TOPICAL at 01:10

## 2021-06-16 RX ADMIN — SODIUM CHLORIDE SCH UNIT: 9 INJECTION, SOLUTION INTRAVENOUS at 05:48

## 2021-06-16 RX ADMIN — MAGNESIUM OXIDE TAB 400 MG (241.3 MG ELEMENTAL MG) SCH MG: 400 (241.3 MG) TAB at 13:41

## 2021-06-16 RX ADMIN — SODIUM HYPOCHLORITE SCH APPL: 1.25 SOLUTION TOPICAL at 16:00

## 2021-06-16 RX ADMIN — GUAIFENESIN SCH MG: 600 TABLET, EXTENDED RELEASE ORAL at 13:44

## 2021-06-16 RX ADMIN — NITROGLYCERIN SCH INCH: 20 OINTMENT TOPICAL at 18:16

## 2021-06-16 RX ADMIN — CARVEDILOL SCH MG: 3.12 TABLET, FILM COATED ORAL at 13:42

## 2021-06-16 RX ADMIN — SACUBITRIL AND VALSARTAN SCH EACH: 49; 51 TABLET, FILM COATED ORAL at 14:00

## 2021-06-16 RX ADMIN — SODIUM CHLORIDE SCH UNIT: 9 INJECTION, SOLUTION INTRAVENOUS at 11:30

## 2021-06-16 RX ADMIN — SACUBITRIL AND VALSARTAN SCH EACH: 49; 51 TABLET, FILM COATED ORAL at 21:16

## 2021-06-16 RX ADMIN — TRAMADOL HYDROCHLORIDE PRN MG: 50 TABLET, FILM COATED ORAL at 12:35

## 2021-06-16 RX ADMIN — SODIUM CHLORIDE SCH UNIT: 9 INJECTION, SOLUTION INTRAVENOUS at 21:00

## 2021-06-16 RX ADMIN — ASPIRIN SCH MG: 81 TABLET, CHEWABLE ORAL at 13:40

## 2021-06-16 RX ADMIN — Medication SCH MG: at 13:41

## 2021-06-16 RX ADMIN — NITROGLYCERIN SCH INCH: 20 OINTMENT TOPICAL at 13:40

## 2021-06-16 RX ADMIN — CLOPIDOGREL BISULFATE SCH MG: 75 TABLET, FILM COATED ORAL at 12:00

## 2021-06-16 RX ADMIN — SODIUM CHLORIDE SCH MG: 9 INJECTION, SOLUTION INTRAVENOUS at 13:42

## 2021-06-16 RX ADMIN — SODIUM CHLORIDE SCH GM: 9 INJECTION, SOLUTION INTRAVENOUS at 18:18

## 2021-06-16 RX ADMIN — AMIODARONE HYDROCHLORIDE SCH MG: 200 TABLET ORAL at 13:41

## 2021-06-16 RX ADMIN — SODIUM CHLORIDE PRN GM: 9 INJECTION, SOLUTION INTRAVENOUS at 08:30

## 2021-06-16 RX ADMIN — GUAIFENESIN SCH MG: 600 TABLET, EXTENDED RELEASE ORAL at 20:30

## 2021-06-16 RX ADMIN — ATORVASTATIN CALCIUM SCH MG: 40 TABLET, FILM COATED ORAL at 20:30

## 2021-06-16 RX ADMIN — SODIUM CHLORIDE SCH UNIT: 9 INJECTION, SOLUTION INTRAVENOUS at 16:30

## 2021-06-16 RX ADMIN — SODIUM CHLORIDE PRN GM: 9 INJECTION, SOLUTION INTRAVENOUS at 07:25

## 2021-06-16 RX ADMIN — CARVEDILOL SCH MG: 3.12 TABLET, FILM COATED ORAL at 20:30

## 2021-06-17 VITALS — DIASTOLIC BLOOD PRESSURE: 57 MMHG | SYSTOLIC BLOOD PRESSURE: 143 MMHG

## 2021-06-17 VITALS — DIASTOLIC BLOOD PRESSURE: 58 MMHG | SYSTOLIC BLOOD PRESSURE: 133 MMHG

## 2021-06-17 VITALS — SYSTOLIC BLOOD PRESSURE: 148 MMHG | DIASTOLIC BLOOD PRESSURE: 47 MMHG

## 2021-06-17 VITALS — SYSTOLIC BLOOD PRESSURE: 132 MMHG | DIASTOLIC BLOOD PRESSURE: 47 MMHG

## 2021-06-17 VITALS — DIASTOLIC BLOOD PRESSURE: 52 MMHG | SYSTOLIC BLOOD PRESSURE: 172 MMHG

## 2021-06-17 VITALS — DIASTOLIC BLOOD PRESSURE: 51 MMHG | SYSTOLIC BLOOD PRESSURE: 163 MMHG

## 2021-06-17 VITALS — SYSTOLIC BLOOD PRESSURE: 132 MMHG | DIASTOLIC BLOOD PRESSURE: 56 MMHG

## 2021-06-17 VITALS — SYSTOLIC BLOOD PRESSURE: 130 MMHG | DIASTOLIC BLOOD PRESSURE: 43 MMHG

## 2021-06-17 VITALS — DIASTOLIC BLOOD PRESSURE: 58 MMHG | SYSTOLIC BLOOD PRESSURE: 138 MMHG

## 2021-06-17 VITALS — DIASTOLIC BLOOD PRESSURE: 48 MMHG | SYSTOLIC BLOOD PRESSURE: 140 MMHG

## 2021-06-17 VITALS — DIASTOLIC BLOOD PRESSURE: 59 MMHG | SYSTOLIC BLOOD PRESSURE: 178 MMHG

## 2021-06-17 VITALS — DIASTOLIC BLOOD PRESSURE: 62 MMHG | SYSTOLIC BLOOD PRESSURE: 129 MMHG

## 2021-06-17 VITALS — SYSTOLIC BLOOD PRESSURE: 158 MMHG | DIASTOLIC BLOOD PRESSURE: 47 MMHG

## 2021-06-17 LAB
BUN SERPL-MCNC: 13 MG/DL (ref 7–18)
CHLORIDE SERPL-SCNC: 104 MMOL/L (ref 101–111)
CO2 SERPL-SCNC: 26 MMOL/L (ref 21–32)
CREAT SERPL-MCNC: 0.9 MG/DL (ref 0.5–1.5)
ERYTHROCYTE [DISTWIDTH] IN BLOOD BY AUTOMATED COUNT: 15.4 % (ref 11–15.5)
GFR SERPL CREATININE-BSD FRML MDRD: 89 ML/MIN (ref 60–?)
GLUCOSE SERPL-MCNC: 142 MG/DL (ref 70–105)
HCT VFR BLD AUTO: 27.6 % (ref 42–54)
MCH RBC QN AUTO: 27.7 PG (ref 27–33)
MCHC RBC AUTO-ENTMCNC: 30.4 G/DL (ref 32–36)
MCV RBC AUTO: 91.1 FL (ref 79–99)
NRBC BLD MANUAL-RTO: 0 % (ref 0–0.19)
PLATELET # BLD AUTO: 275 K/UL (ref 130–400)
POTASSIUM SERPL-SCNC: 3.8 MMOL/L (ref 3.5–5.1)
RBC # BLD AUTO: 3.03 MIL/UL (ref 4.5–6.2)
RBC MORPH BLD: (no result)
SODIUM SERPL-SCNC: 139 MMOL/L (ref 136–145)
WBC # BLD AUTO: 9.6 K/UL (ref 4.8–10.8)

## 2021-06-17 RX ADMIN — IPRATROPIUM BROMIDE SCH MG: 0.5 SOLUTION RESPIRATORY (INHALATION) at 09:30

## 2021-06-17 RX ADMIN — TRAMADOL HYDROCHLORIDE PRN MG: 50 TABLET, FILM COATED ORAL at 07:36

## 2021-06-17 RX ADMIN — SODIUM CHLORIDE SCH GM: 9 INJECTION, SOLUTION INTRAVENOUS at 00:22

## 2021-06-17 RX ADMIN — Medication SCH MG: at 09:24

## 2021-06-17 RX ADMIN — ASPIRIN SCH MG: 81 TABLET, CHEWABLE ORAL at 09:24

## 2021-06-17 RX ADMIN — IPRATROPIUM BROMIDE AND ALBUTEROL SULFATE SCH UDVIAL: .5; 3 SOLUTION RESPIRATORY (INHALATION) at 19:11

## 2021-06-17 RX ADMIN — NITROGLYCERIN SCH INCH: 20 OINTMENT TOPICAL at 09:25

## 2021-06-17 RX ADMIN — CARVEDILOL SCH MG: 3.12 TABLET, FILM COATED ORAL at 21:14

## 2021-06-17 RX ADMIN — CARVEDILOL SCH MG: 3.12 TABLET, FILM COATED ORAL at 09:23

## 2021-06-17 RX ADMIN — IPRATROPIUM BROMIDE SCH MG: 0.5 SOLUTION RESPIRATORY (INHALATION) at 11:37

## 2021-06-17 RX ADMIN — SODIUM CHLORIDE SCH UNIT: 9 INJECTION, SOLUTION INTRAVENOUS at 11:33

## 2021-06-17 RX ADMIN — IPRATROPIUM BROMIDE AND ALBUTEROL SULFATE SCH UDVIAL: .5; 3 SOLUTION RESPIRATORY (INHALATION) at 14:25

## 2021-06-17 RX ADMIN — TRAMADOL HYDROCHLORIDE PRN MG: 50 TABLET, FILM COATED ORAL at 12:21

## 2021-06-17 RX ADMIN — SACUBITRIL AND VALSARTAN SCH EACH: 49; 51 TABLET, FILM COATED ORAL at 21:14

## 2021-06-17 RX ADMIN — SODIUM HYPOCHLORITE SCH APPL: 1.25 SOLUTION TOPICAL at 09:38

## 2021-06-17 RX ADMIN — ATORVASTATIN CALCIUM SCH MG: 40 TABLET, FILM COATED ORAL at 21:14

## 2021-06-17 RX ADMIN — IPRATROPIUM BROMIDE AND ALBUTEROL SULFATE SCH UDVIAL: .5; 3 SOLUTION RESPIRATORY (INHALATION) at 22:29

## 2021-06-17 RX ADMIN — SODIUM CHLORIDE SCH GM: 9 INJECTION, SOLUTION INTRAVENOUS at 09:22

## 2021-06-17 RX ADMIN — AMIODARONE HYDROCHLORIDE SCH MG: 200 TABLET ORAL at 09:23

## 2021-06-17 RX ADMIN — MAGNESIUM OXIDE TAB 400 MG (241.3 MG ELEMENTAL MG) SCH MG: 400 (241.3 MG) TAB at 09:24

## 2021-06-17 RX ADMIN — GABAPENTIN SCH MG: 300 CAPSULE ORAL at 09:23

## 2021-06-17 RX ADMIN — SODIUM CHLORIDE SCH MG: 9 INJECTION, SOLUTION INTRAVENOUS at 09:22

## 2021-06-17 RX ADMIN — NITROGLYCERIN SCH INCH: 20 OINTMENT TOPICAL at 00:50

## 2021-06-17 RX ADMIN — SODIUM CHLORIDE SCH UNIT: 9 INJECTION, SOLUTION INTRAVENOUS at 05:30

## 2021-06-17 RX ADMIN — SODIUM CHLORIDE SCH UNIT: 9 INJECTION, SOLUTION INTRAVENOUS at 21:00

## 2021-06-17 RX ADMIN — CLOPIDOGREL BISULFATE SCH MG: 75 TABLET, FILM COATED ORAL at 09:24

## 2021-06-17 RX ADMIN — GABAPENTIN SCH MG: 300 CAPSULE ORAL at 21:14

## 2021-06-17 RX ADMIN — SACUBITRIL AND VALSARTAN SCH EACH: 49; 51 TABLET, FILM COATED ORAL at 09:23

## 2021-06-17 RX ADMIN — BUDESONIDE SCH MG: 0.5 SUSPENSION RESPIRATORY (INHALATION) at 19:20

## 2021-06-17 RX ADMIN — SODIUM CHLORIDE SCH UNIT: 9 INJECTION, SOLUTION INTRAVENOUS at 16:04

## 2021-06-17 RX ADMIN — GUAIFENESIN SCH MG: 600 TABLET, EXTENDED RELEASE ORAL at 09:23

## 2021-06-18 VITALS — SYSTOLIC BLOOD PRESSURE: 155 MMHG | DIASTOLIC BLOOD PRESSURE: 54 MMHG

## 2021-06-18 VITALS — DIASTOLIC BLOOD PRESSURE: 55 MMHG | SYSTOLIC BLOOD PRESSURE: 146 MMHG

## 2021-06-18 VITALS — SYSTOLIC BLOOD PRESSURE: 125 MMHG | DIASTOLIC BLOOD PRESSURE: 53 MMHG

## 2021-06-18 VITALS — SYSTOLIC BLOOD PRESSURE: 145 MMHG | DIASTOLIC BLOOD PRESSURE: 58 MMHG

## 2021-06-18 VITALS — SYSTOLIC BLOOD PRESSURE: 148 MMHG | DIASTOLIC BLOOD PRESSURE: 54 MMHG

## 2021-06-18 VITALS — DIASTOLIC BLOOD PRESSURE: 52 MMHG | SYSTOLIC BLOOD PRESSURE: 136 MMHG

## 2021-06-18 RX ADMIN — IPRATROPIUM BROMIDE AND ALBUTEROL SULFATE SCH UDVIAL: .5; 3 SOLUTION RESPIRATORY (INHALATION) at 00:55

## 2021-06-18 RX ADMIN — SODIUM HYPOCHLORITE SCH APPL: 1.25 SOLUTION TOPICAL at 09:18

## 2021-06-18 RX ADMIN — Medication SCH GM: at 11:09

## 2021-06-18 RX ADMIN — TRAMADOL HYDROCHLORIDE PRN MG: 50 TABLET, FILM COATED ORAL at 11:19

## 2021-06-18 RX ADMIN — IPRATROPIUM BROMIDE AND ALBUTEROL SULFATE SCH UDVIAL: .5; 3 SOLUTION RESPIRATORY (INHALATION) at 18:44

## 2021-06-18 RX ADMIN — SODIUM CHLORIDE SCH UNIT: 9 INJECTION, SOLUTION INTRAVENOUS at 22:39

## 2021-06-18 RX ADMIN — SACUBITRIL AND VALSARTAN SCH EACH: 49; 51 TABLET, FILM COATED ORAL at 22:33

## 2021-06-18 RX ADMIN — IPRATROPIUM BROMIDE AND ALBUTEROL SULFATE SCH UDVIAL: .5; 3 SOLUTION RESPIRATORY (INHALATION) at 10:56

## 2021-06-18 RX ADMIN — IPRATROPIUM BROMIDE AND ALBUTEROL SULFATE SCH UDVIAL: .5; 3 SOLUTION RESPIRATORY (INHALATION) at 14:05

## 2021-06-18 RX ADMIN — GABAPENTIN SCH MG: 300 CAPSULE ORAL at 22:33

## 2021-06-18 RX ADMIN — BUDESONIDE SCH MG: 0.5 SUSPENSION RESPIRATORY (INHALATION) at 06:33

## 2021-06-18 RX ADMIN — SODIUM CHLORIDE SCH UNIT: 9 INJECTION, SOLUTION INTRAVENOUS at 12:22

## 2021-06-18 RX ADMIN — Medication SCH APPL: at 09:18

## 2021-06-18 RX ADMIN — BUDESONIDE SCH MG: 0.5 SUSPENSION RESPIRATORY (INHALATION) at 18:49

## 2021-06-18 RX ADMIN — SODIUM CHLORIDE SCH MG: 9 INJECTION, SOLUTION INTRAVENOUS at 09:19

## 2021-06-18 RX ADMIN — AMIODARONE HYDROCHLORIDE SCH MG: 200 TABLET ORAL at 09:17

## 2021-06-18 RX ADMIN — MAGNESIUM OXIDE TAB 400 MG (241.3 MG ELEMENTAL MG) SCH MG: 400 (241.3 MG) TAB at 09:16

## 2021-06-18 RX ADMIN — ASPIRIN SCH MG: 81 TABLET, CHEWABLE ORAL at 09:15

## 2021-06-18 RX ADMIN — SODIUM CHLORIDE SCH UNIT: 9 INJECTION, SOLUTION INTRAVENOUS at 16:30

## 2021-06-18 RX ADMIN — SACUBITRIL AND VALSARTAN SCH EACH: 49; 51 TABLET, FILM COATED ORAL at 09:20

## 2021-06-18 RX ADMIN — Medication SCH MG: at 09:16

## 2021-06-18 RX ADMIN — CLOPIDOGREL BISULFATE SCH MG: 75 TABLET, FILM COATED ORAL at 09:17

## 2021-06-18 RX ADMIN — CARVEDILOL SCH MG: 3.12 TABLET, FILM COATED ORAL at 22:33

## 2021-06-18 RX ADMIN — IPRATROPIUM BROMIDE AND ALBUTEROL SULFATE SCH UDVIAL: .5; 3 SOLUTION RESPIRATORY (INHALATION) at 06:33

## 2021-06-18 RX ADMIN — GABAPENTIN SCH MG: 300 CAPSULE ORAL at 09:17

## 2021-06-18 RX ADMIN — ATORVASTATIN CALCIUM SCH MG: 40 TABLET, FILM COATED ORAL at 22:33

## 2021-06-18 RX ADMIN — SODIUM CHLORIDE SCH UNIT: 9 INJECTION, SOLUTION INTRAVENOUS at 07:30

## 2021-06-18 RX ADMIN — CARVEDILOL SCH MG: 3.12 TABLET, FILM COATED ORAL at 09:16

## 2021-06-18 RX ADMIN — IPRATROPIUM BROMIDE AND ALBUTEROL SULFATE SCH UDVIAL: .5; 3 SOLUTION RESPIRATORY (INHALATION) at 23:28

## 2021-06-19 VITALS — SYSTOLIC BLOOD PRESSURE: 112 MMHG | DIASTOLIC BLOOD PRESSURE: 57 MMHG

## 2021-06-19 VITALS — DIASTOLIC BLOOD PRESSURE: 59 MMHG | SYSTOLIC BLOOD PRESSURE: 122 MMHG

## 2021-06-19 VITALS — DIASTOLIC BLOOD PRESSURE: 49 MMHG | SYSTOLIC BLOOD PRESSURE: 123 MMHG

## 2021-06-19 VITALS — DIASTOLIC BLOOD PRESSURE: 48 MMHG | SYSTOLIC BLOOD PRESSURE: 135 MMHG

## 2021-06-19 VITALS — SYSTOLIC BLOOD PRESSURE: 129 MMHG | DIASTOLIC BLOOD PRESSURE: 50 MMHG

## 2021-06-19 VITALS — SYSTOLIC BLOOD PRESSURE: 139 MMHG | DIASTOLIC BLOOD PRESSURE: 53 MMHG

## 2021-06-19 VITALS — SYSTOLIC BLOOD PRESSURE: 138 MMHG | DIASTOLIC BLOOD PRESSURE: 57 MMHG

## 2021-06-19 LAB
BUN SERPL-MCNC: 17 MG/DL (ref 7–18)
CHLORIDE SERPL-SCNC: 104 MMOL/L (ref 101–111)
CO2 SERPL-SCNC: 29 MMOL/L (ref 21–32)
CREAT SERPL-MCNC: 1.1 MG/DL (ref 0.5–1.5)
ERYTHROCYTE [DISTWIDTH] IN BLOOD BY AUTOMATED COUNT: 15.2 % (ref 11–15.5)
GFR SERPL CREATININE-BSD FRML MDRD: 71 ML/MIN (ref 60–?)
GLUCOSE SERPL-MCNC: 203 MG/DL (ref 70–105)
HCT VFR BLD AUTO: 28.7 % (ref 42–54)
MCH RBC QN AUTO: 28.3 PG (ref 27–33)
MCHC RBC AUTO-ENTMCNC: 31 G/DL (ref 32–36)
MCV RBC AUTO: 91.1 FL (ref 79–99)
NRBC BLD MANUAL-RTO: 0 % (ref 0–0.19)
PLATELET # BLD AUTO: 289 K/UL (ref 130–400)
POTASSIUM SERPL-SCNC: 4.1 MMOL/L (ref 3.5–5.1)
RBC # BLD AUTO: 3.15 MIL/UL (ref 4.5–6.2)
SODIUM SERPL-SCNC: 139 MMOL/L (ref 136–145)
WBC # BLD AUTO: 10.6 K/UL (ref 4.8–10.8)

## 2021-06-19 RX ADMIN — BUDESONIDE SCH MG: 0.5 SUSPENSION RESPIRATORY (INHALATION) at 06:09

## 2021-06-19 RX ADMIN — Medication SCH MG: at 09:06

## 2021-06-19 RX ADMIN — SODIUM CHLORIDE SCH UNIT: 9 INJECTION, SOLUTION INTRAVENOUS at 16:39

## 2021-06-19 RX ADMIN — IPRATROPIUM BROMIDE AND ALBUTEROL SULFATE SCH UDVIAL: .5; 3 SOLUTION RESPIRATORY (INHALATION) at 06:09

## 2021-06-19 RX ADMIN — Medication SCH APPL: at 09:07

## 2021-06-19 RX ADMIN — SODIUM CHLORIDE SCH UNIT: 9 INJECTION, SOLUTION INTRAVENOUS at 21:00

## 2021-06-19 RX ADMIN — CLOPIDOGREL BISULFATE SCH MG: 75 TABLET, FILM COATED ORAL at 09:06

## 2021-06-19 RX ADMIN — IPRATROPIUM BROMIDE AND ALBUTEROL SULFATE SCH UDVIAL: .5; 3 SOLUTION RESPIRATORY (INHALATION) at 02:03

## 2021-06-19 RX ADMIN — ASPIRIN SCH MG: 81 TABLET, CHEWABLE ORAL at 09:05

## 2021-06-19 RX ADMIN — Medication SCH GM: at 09:05

## 2021-06-19 RX ADMIN — AMIODARONE HYDROCHLORIDE SCH MG: 200 TABLET ORAL at 09:06

## 2021-06-19 RX ADMIN — GABAPENTIN SCH MG: 300 CAPSULE ORAL at 19:45

## 2021-06-19 RX ADMIN — BUDESONIDE SCH MG: 0.5 SUSPENSION RESPIRATORY (INHALATION) at 18:43

## 2021-06-19 RX ADMIN — CARVEDILOL SCH MG: 3.12 TABLET, FILM COATED ORAL at 19:47

## 2021-06-19 RX ADMIN — SODIUM CHLORIDE SCH UNIT: 9 INJECTION, SOLUTION INTRAVENOUS at 11:30

## 2021-06-19 RX ADMIN — CARVEDILOL SCH MG: 3.12 TABLET, FILM COATED ORAL at 09:10

## 2021-06-19 RX ADMIN — SACUBITRIL AND VALSARTAN SCH EACH: 49; 51 TABLET, FILM COATED ORAL at 09:05

## 2021-06-19 RX ADMIN — IPRATROPIUM BROMIDE AND ALBUTEROL SULFATE SCH UDVIAL: .5; 3 SOLUTION RESPIRATORY (INHALATION) at 23:32

## 2021-06-19 RX ADMIN — IPRATROPIUM BROMIDE AND ALBUTEROL SULFATE SCH UDVIAL: .5; 3 SOLUTION RESPIRATORY (INHALATION) at 18:43

## 2021-06-19 RX ADMIN — SACUBITRIL AND VALSARTAN SCH EACH: 49; 51 TABLET, FILM COATED ORAL at 19:45

## 2021-06-19 RX ADMIN — MAGNESIUM OXIDE TAB 400 MG (241.3 MG ELEMENTAL MG) SCH MG: 400 (241.3 MG) TAB at 09:05

## 2021-06-19 RX ADMIN — IPRATROPIUM BROMIDE AND ALBUTEROL SULFATE SCH UDVIAL: .5; 3 SOLUTION RESPIRATORY (INHALATION) at 10:16

## 2021-06-19 RX ADMIN — SODIUM HYPOCHLORITE SCH APPL: 1.25 SOLUTION TOPICAL at 09:07

## 2021-06-19 RX ADMIN — GABAPENTIN SCH MG: 300 CAPSULE ORAL at 09:05

## 2021-06-19 RX ADMIN — SODIUM CHLORIDE SCH UNIT: 9 INJECTION, SOLUTION INTRAVENOUS at 06:04

## 2021-06-19 RX ADMIN — IPRATROPIUM BROMIDE AND ALBUTEROL SULFATE SCH UDVIAL: .5; 3 SOLUTION RESPIRATORY (INHALATION) at 14:17

## 2021-06-19 RX ADMIN — ATORVASTATIN CALCIUM SCH MG: 40 TABLET, FILM COATED ORAL at 19:45

## 2021-06-19 RX ADMIN — SODIUM CHLORIDE SCH MG: 9 INJECTION, SOLUTION INTRAVENOUS at 09:05

## 2021-06-20 VITALS — SYSTOLIC BLOOD PRESSURE: 137 MMHG | DIASTOLIC BLOOD PRESSURE: 69 MMHG

## 2021-06-20 VITALS — DIASTOLIC BLOOD PRESSURE: 56 MMHG | SYSTOLIC BLOOD PRESSURE: 163 MMHG

## 2021-06-20 VITALS — SYSTOLIC BLOOD PRESSURE: 93 MMHG | DIASTOLIC BLOOD PRESSURE: 48 MMHG

## 2021-06-20 VITALS — SYSTOLIC BLOOD PRESSURE: 148 MMHG | DIASTOLIC BLOOD PRESSURE: 52 MMHG

## 2021-06-20 VITALS — SYSTOLIC BLOOD PRESSURE: 133 MMHG | DIASTOLIC BLOOD PRESSURE: 48 MMHG

## 2021-06-20 LAB
BASOPHILS NFR BLD AUTO: 0.4 % (ref 0–5)
BUN SERPL-MCNC: 17 MG/DL (ref 7–18)
CHLORIDE SERPL-SCNC: 103 MMOL/L (ref 101–111)
CO2 SERPL-SCNC: 29 MMOL/L (ref 21–32)
CREAT SERPL-MCNC: 1 MG/DL (ref 0.5–1.5)
EOSINOPHIL NFR BLD AUTO: 2 % (ref 0–8)
ERYTHROCYTE [DISTWIDTH] IN BLOOD BY AUTOMATED COUNT: 14.9 % (ref 11–15.5)
GFR SERPL CREATININE-BSD FRML MDRD: 79 ML/MIN (ref 60–?)
GLUCOSE SERPL-MCNC: 160 MG/DL (ref 70–105)
HCT VFR BLD AUTO: 28.9 % (ref 42–54)
LYMPHOCYTES NFR SPEC AUTO: 11 % (ref 21–51)
MCH RBC QN AUTO: 27.9 PG (ref 27–33)
MCHC RBC AUTO-ENTMCNC: 30.8 G/DL (ref 32–36)
MCV RBC AUTO: 90.6 FL (ref 79–99)
MONOCYTES NFR BLD AUTO: 6.1 % (ref 3–13)
NEUTROPHILS NFR BLD AUTO: 79.9 % (ref 40–77)
NRBC BLD MANUAL-RTO: 0 % (ref 0–0.19)
PLATELET # BLD AUTO: 301 K/UL (ref 130–400)
POTASSIUM SERPL-SCNC: 4 MMOL/L (ref 3.5–5.1)
RBC # BLD AUTO: 3.19 MIL/UL (ref 4.5–6.2)
SODIUM SERPL-SCNC: 139 MMOL/L (ref 136–145)
WBC # BLD AUTO: 10.3 K/UL (ref 4.8–10.8)

## 2021-06-20 RX ADMIN — AMIODARONE HYDROCHLORIDE SCH MG: 200 TABLET ORAL at 09:04

## 2021-06-20 RX ADMIN — SODIUM HYPOCHLORITE SCH APPL: 1.25 SOLUTION TOPICAL at 09:42

## 2021-06-20 RX ADMIN — IPRATROPIUM BROMIDE AND ALBUTEROL SULFATE SCH UDVIAL: .5; 3 SOLUTION RESPIRATORY (INHALATION) at 14:19

## 2021-06-20 RX ADMIN — CARVEDILOL SCH MG: 3.12 TABLET, FILM COATED ORAL at 08:59

## 2021-06-20 RX ADMIN — CARVEDILOL SCH MG: 3.12 TABLET, FILM COATED ORAL at 19:55

## 2021-06-20 RX ADMIN — IPRATROPIUM BROMIDE AND ALBUTEROL SULFATE SCH UDVIAL: .5; 3 SOLUTION RESPIRATORY (INHALATION) at 10:08

## 2021-06-20 RX ADMIN — SODIUM CHLORIDE SCH UNIT: 9 INJECTION, SOLUTION INTRAVENOUS at 11:31

## 2021-06-20 RX ADMIN — IPRATROPIUM BROMIDE AND ALBUTEROL SULFATE SCH UDVIAL: .5; 3 SOLUTION RESPIRATORY (INHALATION) at 06:52

## 2021-06-20 RX ADMIN — ATORVASTATIN CALCIUM SCH MG: 40 TABLET, FILM COATED ORAL at 19:54

## 2021-06-20 RX ADMIN — IPRATROPIUM BROMIDE AND ALBUTEROL SULFATE SCH UDVIAL: .5; 3 SOLUTION RESPIRATORY (INHALATION) at 02:43

## 2021-06-20 RX ADMIN — SODIUM CHLORIDE SCH UNIT: 9 INJECTION, SOLUTION INTRAVENOUS at 06:32

## 2021-06-20 RX ADMIN — GABAPENTIN SCH MG: 300 CAPSULE ORAL at 19:54

## 2021-06-20 RX ADMIN — Medication SCH APPL: at 09:42

## 2021-06-20 RX ADMIN — ASPIRIN SCH MG: 81 TABLET, CHEWABLE ORAL at 08:58

## 2021-06-20 RX ADMIN — IPRATROPIUM BROMIDE AND ALBUTEROL SULFATE SCH UDVIAL: .5; 3 SOLUTION RESPIRATORY (INHALATION) at 18:25

## 2021-06-20 RX ADMIN — CLOPIDOGREL BISULFATE SCH MG: 75 TABLET, FILM COATED ORAL at 09:03

## 2021-06-20 RX ADMIN — Medication SCH MG: at 09:00

## 2021-06-20 RX ADMIN — Medication SCH GM: at 09:00

## 2021-06-20 RX ADMIN — BUDESONIDE SCH MG: 0.5 SUSPENSION RESPIRATORY (INHALATION) at 18:25

## 2021-06-20 RX ADMIN — GABAPENTIN SCH MG: 300 CAPSULE ORAL at 09:03

## 2021-06-20 RX ADMIN — SACUBITRIL AND VALSARTAN SCH EACH: 49; 51 TABLET, FILM COATED ORAL at 08:59

## 2021-06-20 RX ADMIN — SODIUM CHLORIDE SCH MG: 9 INJECTION, SOLUTION INTRAVENOUS at 08:59

## 2021-06-20 RX ADMIN — BUDESONIDE SCH MG: 0.5 SUSPENSION RESPIRATORY (INHALATION) at 06:52

## 2021-06-20 RX ADMIN — SODIUM CHLORIDE SCH UNIT: 9 INJECTION, SOLUTION INTRAVENOUS at 16:30

## 2021-06-20 RX ADMIN — MAGNESIUM OXIDE TAB 400 MG (241.3 MG ELEMENTAL MG) SCH MG: 400 (241.3 MG) TAB at 09:03

## 2021-06-20 RX ADMIN — SACUBITRIL AND VALSARTAN SCH EACH: 49; 51 TABLET, FILM COATED ORAL at 19:55

## 2021-08-11 ENCOUNTER — HOSPITAL ENCOUNTER (OUTPATIENT)
Dept: HOSPITAL 90 - WHH | Age: 66
Discharge: HOME | End: 2021-08-11
Attending: PODIATRIST
Payer: COMMERCIAL

## 2021-08-11 DIAGNOSIS — E78.00: ICD-10-CM

## 2021-08-11 DIAGNOSIS — N18.2: ICD-10-CM

## 2021-08-11 DIAGNOSIS — Z86.19: ICD-10-CM

## 2021-08-11 DIAGNOSIS — M62.82: ICD-10-CM

## 2021-08-11 DIAGNOSIS — Z79.02: ICD-10-CM

## 2021-08-11 DIAGNOSIS — I13.0: ICD-10-CM

## 2021-08-11 DIAGNOSIS — J44.9: ICD-10-CM

## 2021-08-11 DIAGNOSIS — I50.9: ICD-10-CM

## 2021-08-11 DIAGNOSIS — Z95.1: ICD-10-CM

## 2021-08-11 DIAGNOSIS — E78.5: ICD-10-CM

## 2021-08-11 DIAGNOSIS — Z79.01: ICD-10-CM

## 2021-08-11 DIAGNOSIS — L97.512: ICD-10-CM

## 2021-08-11 DIAGNOSIS — L97.422: ICD-10-CM

## 2021-08-11 DIAGNOSIS — Z89.612: ICD-10-CM

## 2021-08-11 DIAGNOSIS — E11.22: ICD-10-CM

## 2021-08-11 DIAGNOSIS — E11.621: ICD-10-CM

## 2021-08-11 DIAGNOSIS — I25.5: ICD-10-CM

## 2021-08-11 DIAGNOSIS — K21.9: ICD-10-CM

## 2021-08-11 DIAGNOSIS — I70.234: Primary | ICD-10-CM

## 2021-08-11 DIAGNOSIS — Z79.899: ICD-10-CM

## 2021-08-11 DIAGNOSIS — Z87.891: ICD-10-CM

## 2021-11-30 ENCOUNTER — HOSPITAL ENCOUNTER (INPATIENT)
Dept: HOSPITAL 90 - EDH | Age: 66
LOS: 2 days | Discharge: HOME | DRG: 193 | End: 2021-12-02
Attending: INTERNAL MEDICINE | Admitting: INTERNAL MEDICINE
Payer: COMMERCIAL

## 2021-11-30 VITALS — WEIGHT: 183.2 LBS | BODY MASS INDEX: 22.31 KG/M2 | HEIGHT: 76 IN

## 2021-11-30 DIAGNOSIS — Z90.5: ICD-10-CM

## 2021-11-30 DIAGNOSIS — J20.9: ICD-10-CM

## 2021-11-30 DIAGNOSIS — Z89.612: ICD-10-CM

## 2021-11-30 DIAGNOSIS — J44.1: ICD-10-CM

## 2021-11-30 DIAGNOSIS — N18.9: ICD-10-CM

## 2021-11-30 DIAGNOSIS — Z79.84: ICD-10-CM

## 2021-11-30 DIAGNOSIS — Z88.8: ICD-10-CM

## 2021-11-30 DIAGNOSIS — J18.9: Primary | ICD-10-CM

## 2021-11-30 DIAGNOSIS — Z87.891: ICD-10-CM

## 2021-11-30 DIAGNOSIS — J44.0: ICD-10-CM

## 2021-11-30 DIAGNOSIS — Z79.02: ICD-10-CM

## 2021-11-30 DIAGNOSIS — E11.51: ICD-10-CM

## 2021-11-30 DIAGNOSIS — D64.9: ICD-10-CM

## 2021-11-30 DIAGNOSIS — I13.0: ICD-10-CM

## 2021-11-30 DIAGNOSIS — I50.23: ICD-10-CM

## 2021-11-30 DIAGNOSIS — Z95.810: ICD-10-CM

## 2021-11-30 DIAGNOSIS — E78.00: ICD-10-CM

## 2021-11-30 DIAGNOSIS — J96.01: ICD-10-CM

## 2021-11-30 DIAGNOSIS — E78.5: ICD-10-CM

## 2021-11-30 DIAGNOSIS — Z95.1: ICD-10-CM

## 2021-11-30 DIAGNOSIS — E11.22: ICD-10-CM

## 2021-11-30 DIAGNOSIS — I25.10: ICD-10-CM

## 2021-11-30 DIAGNOSIS — Z20.822: ICD-10-CM

## 2021-11-30 DIAGNOSIS — Z85.528: ICD-10-CM

## 2021-11-30 DIAGNOSIS — Z79.899: ICD-10-CM

## 2021-11-30 LAB
ALBUMIN SERPL-MCNC: 3 G/DL (ref 3.5–5)
ALT SERPL-CCNC: 89 U/L (ref 12–78)
AST SERPL-CCNC: 85 U/L (ref 10–37)
BASE EXCESS BLDA CALC-SCNC: 0.2 MMOL/L (ref -2–3)
BASOPHILS NFR BLD AUTO: 0.4 % (ref 0–5)
BILIRUB SERPL-MCNC: 0.7 MG/DL (ref 0.2–1)
BNP SERPL-MCNC: 4000 PG/ML (ref 0–100)
BUN SERPL-MCNC: 22 MG/DL (ref 7–18)
CHLORIDE SERPL-SCNC: 104 MMOL/L (ref 101–111)
CK SERPL-CCNC: 181 U/L (ref 21–232)
CK SERPL-CCNC: 186 U/L (ref 21–232)
CO2 SERPL-SCNC: 24 MMOL/L (ref 21–32)
CREAT SERPL-MCNC: 1.6 MG/DL (ref 0.5–1.5)
EOSINOPHIL NFR BLD AUTO: 0 % (ref 0–8)
ERYTHROCYTE [DISTWIDTH] IN BLOOD BY AUTOMATED COUNT: 16.9 % (ref 11–15.5)
GFR SERPL CREATININE-BSD FRML MDRD: 46 ML/MIN (ref 60–?)
GLUCOSE SERPL-MCNC: 280 MG/DL (ref 70–105)
HCO3 BLDA-SCNC: 24.1 MMOL/L (ref 21–28)
HCT VFR BLD AUTO: 33.3 % (ref 42–54)
LYMPHOCYTES NFR SPEC AUTO: 7.6 % (ref 21–51)
MCH RBC QN AUTO: 23.5 PG (ref 27–33)
MCHC RBC AUTO-ENTMCNC: 30 G/DL (ref 32–36)
MCV RBC AUTO: 78.2 FL (ref 79–99)
MONOCYTES NFR BLD AUTO: 6.1 % (ref 3–13)
MYOGLOBIN SERPL-MCNC: 142 NG/ML (ref 10–92)
MYOGLOBIN SERPL-MCNC: 147 NG/ML (ref 10–92)
NEUTROPHILS NFR BLD AUTO: 85.5 % (ref 40–77)
NRBC BLD MANUAL-RTO: 0 % (ref 0–0.19)
PCO2 BLDA: 36 MMHG (ref 35–48)
PH UR STRIP: 5 [PH] (ref 5–8)
PLATELET # BLD AUTO: 254 K/UL (ref 130–400)
POTASSIUM SERPL-SCNC: 4.3 MMOL/L (ref 3.5–5.1)
PROT SERPL-MCNC: 7.5 G/DL (ref 6–8.3)
PROT UR QL STRIP: (no result) MG/DL
RBC # BLD AUTO: 4.26 MIL/UL (ref 4.5–6.2)
RBC #/AREA URNS HPF: (no result) /HPF (ref 0–1)
RBC MORPH BLD: (no result)
SAO2 % BLDA: 95.7 % (ref 95–99)
SODIUM SERPL-SCNC: 139 MMOL/L (ref 136–145)
SP GR UR STRIP: 1.01 (ref 1–1.03)
UROBILINOGEN UR STRIP-MCNC: 0.2 MG/DL (ref 0.2–1)
WBC # BLD AUTO: 13.1 K/UL (ref 4.8–10.8)
WBC #/AREA URNS HPF: (no result) /HPF (ref 0–1)

## 2021-11-30 PROCEDURE — 81001 URINALYSIS AUTO W/SCOPE: CPT

## 2021-11-30 PROCEDURE — 85018 HEMOGLOBIN: CPT

## 2021-11-30 PROCEDURE — 82948 REAGENT STRIP/BLOOD GLUCOSE: CPT

## 2021-11-30 PROCEDURE — 85027 COMPLETE CBC AUTOMATED: CPT

## 2021-11-30 PROCEDURE — 94760 N-INVAS EAR/PLS OXIMETRY 1: CPT

## 2021-11-30 PROCEDURE — 78582 LUNG VENTILAT&PERFUS IMAGING: CPT

## 2021-11-30 PROCEDURE — 71045 X-RAY EXAM CHEST 1 VIEW: CPT

## 2021-11-30 PROCEDURE — 84295 ASSAY OF SERUM SODIUM: CPT

## 2021-11-30 PROCEDURE — 85025 COMPLETE CBC W/AUTO DIFF WBC: CPT

## 2021-11-30 PROCEDURE — 87088 URINE BACTERIA CULTURE: CPT

## 2021-11-30 PROCEDURE — 87077 CULTURE AEROBIC IDENTIFY: CPT

## 2021-11-30 PROCEDURE — 94640 AIRWAY INHALATION TREATMENT: CPT

## 2021-11-30 PROCEDURE — 84132 ASSAY OF SERUM POTASSIUM: CPT

## 2021-11-30 PROCEDURE — 82550 ASSAY OF CK (CPK): CPT

## 2021-11-30 PROCEDURE — 84484 ASSAY OF TROPONIN QUANT: CPT

## 2021-11-30 PROCEDURE — 87804 INFLUENZA ASSAY W/OPTIC: CPT

## 2021-11-30 PROCEDURE — 36600 WITHDRAWAL OF ARTERIAL BLOOD: CPT

## 2021-11-30 PROCEDURE — 83880 ASSAY OF NATRIURETIC PEPTIDE: CPT

## 2021-11-30 PROCEDURE — 83605 ASSAY OF LACTIC ACID: CPT

## 2021-11-30 PROCEDURE — 87040 BLOOD CULTURE FOR BACTERIA: CPT

## 2021-11-30 PROCEDURE — 84145 PROCALCITONIN (PCT): CPT

## 2021-11-30 PROCEDURE — 87186 SC STD MICRODIL/AGAR DIL: CPT

## 2021-11-30 PROCEDURE — 99291 CRITICAL CARE FIRST HOUR: CPT

## 2021-11-30 PROCEDURE — 74176 CT ABD & PELVIS W/O CONTRAST: CPT

## 2021-11-30 PROCEDURE — 82947 ASSAY GLUCOSE BLOOD QUANT: CPT

## 2021-11-30 PROCEDURE — 93005 ELECTROCARDIOGRAM TRACING: CPT

## 2021-11-30 PROCEDURE — 82803 BLOOD GASES ANY COMBINATION: CPT

## 2021-11-30 PROCEDURE — 36415 COLL VENOUS BLD VENIPUNCTURE: CPT

## 2021-11-30 PROCEDURE — 94664 DEMO&/EVAL PT USE INHALER: CPT

## 2021-11-30 PROCEDURE — 82435 ASSAY OF BLOOD CHLORIDE: CPT

## 2021-11-30 PROCEDURE — 80048 BASIC METABOLIC PNL TOTAL CA: CPT

## 2021-11-30 PROCEDURE — 97039 UNLISTED MODALITY: CPT

## 2021-11-30 PROCEDURE — 93970 EXTREMITY STUDY: CPT

## 2021-11-30 PROCEDURE — 87635 SARS-COV-2 COVID-19 AMP PRB: CPT

## 2021-11-30 PROCEDURE — 83874 ASSAY OF MYOGLOBIN: CPT

## 2021-11-30 PROCEDURE — 80053 COMPREHEN METABOLIC PANEL: CPT

## 2021-11-30 RX ADMIN — SODIUM CHLORIDE SCH GM: 9 INJECTION, SOLUTION INTRAVENOUS at 14:30

## 2021-11-30 RX ADMIN — SODIUM CHLORIDE SCH MLS/HR: 0.9 INJECTION, SOLUTION INTRAVENOUS at 15:38

## 2021-11-30 RX ADMIN — SODIUM CHLORIDE SCH UNIT: 9 INJECTION, SOLUTION INTRAVENOUS at 22:03

## 2021-11-30 RX ADMIN — AZITHROMYCIN MONOHYDRATE SCH MG: 500 INJECTION, POWDER, LYOPHILIZED, FOR SOLUTION INTRAVENOUS at 14:30

## 2021-11-30 RX ADMIN — IPRATROPIUM BROMIDE AND ALBUTEROL SULFATE SCH UDVIAL: .5; 3 SOLUTION RESPIRATORY (INHALATION) at 17:34

## 2021-11-30 RX ADMIN — SODIUM CHLORIDE SCH ML: 9 INJECTION, SOLUTION INTRAVENOUS at 14:30

## 2021-12-01 VITALS — SYSTOLIC BLOOD PRESSURE: 157 MMHG | DIASTOLIC BLOOD PRESSURE: 74 MMHG

## 2021-12-01 VITALS — DIASTOLIC BLOOD PRESSURE: 73 MMHG | SYSTOLIC BLOOD PRESSURE: 142 MMHG

## 2021-12-01 VITALS — DIASTOLIC BLOOD PRESSURE: 74 MMHG | SYSTOLIC BLOOD PRESSURE: 136 MMHG

## 2021-12-01 VITALS — DIASTOLIC BLOOD PRESSURE: 47 MMHG | SYSTOLIC BLOOD PRESSURE: 112 MMHG

## 2021-12-01 VITALS — SYSTOLIC BLOOD PRESSURE: 139 MMHG | DIASTOLIC BLOOD PRESSURE: 75 MMHG

## 2021-12-01 VITALS — DIASTOLIC BLOOD PRESSURE: 72 MMHG | SYSTOLIC BLOOD PRESSURE: 162 MMHG

## 2021-12-01 VITALS — DIASTOLIC BLOOD PRESSURE: 58 MMHG | SYSTOLIC BLOOD PRESSURE: 106 MMHG

## 2021-12-01 LAB
ALBUMIN SERPL-MCNC: 3 G/DL (ref 3.5–5)
ALT SERPL-CCNC: 116 U/L (ref 12–78)
AST SERPL-CCNC: 107 U/L (ref 10–37)
BASOPHILS NFR BLD AUTO: 0.2 % (ref 0–5)
BILIRUB SERPL-MCNC: 0.4 MG/DL (ref 0.2–1)
BUN SERPL-MCNC: 31 MG/DL (ref 7–18)
CHLORIDE SERPL-SCNC: 105 MMOL/L (ref 101–111)
CK SERPL-CCNC: 197 U/L (ref 21–232)
CK SERPL-CCNC: 226 U/L (ref 21–232)
CO2 SERPL-SCNC: 24 MMOL/L (ref 21–32)
CREAT SERPL-MCNC: 1.6 MG/DL (ref 0.5–1.5)
EOSINOPHIL NFR BLD AUTO: 2.2 % (ref 0–8)
ERYTHROCYTE [DISTWIDTH] IN BLOOD BY AUTOMATED COUNT: 16.9 % (ref 11–15.5)
GFR SERPL CREATININE-BSD FRML MDRD: 46 ML/MIN (ref 60–?)
GLUCOSE SERPL-MCNC: 275 MG/DL (ref 70–105)
HCT VFR BLD AUTO: 34.4 % (ref 42–54)
LYMPHOCYTES NFR SPEC AUTO: 5.4 % (ref 21–51)
MCH RBC QN AUTO: 24 PG (ref 27–33)
MCHC RBC AUTO-ENTMCNC: 29.9 G/DL (ref 32–36)
MCV RBC AUTO: 80.2 FL (ref 79–99)
MONOCYTES NFR BLD AUTO: 1 % (ref 3–13)
MYOGLOBIN SERPL-MCNC: 151 NG/ML (ref 10–92)
MYOGLOBIN SERPL-MCNC: 172 NG/ML (ref 10–92)
NEUTROPHILS NFR BLD AUTO: 90.9 % (ref 40–77)
NRBC BLD MANUAL-RTO: 0 % (ref 0–0.19)
PLATELET # BLD AUTO: 248 K/UL (ref 130–400)
POTASSIUM SERPL-SCNC: 4.2 MMOL/L (ref 3.5–5.1)
PROT SERPL-MCNC: 7.8 G/DL (ref 6–8.3)
RBC # BLD AUTO: 4.29 MIL/UL (ref 4.5–6.2)
SODIUM SERPL-SCNC: 141 MMOL/L (ref 136–145)
WBC # BLD AUTO: 9.9 K/UL (ref 4.8–10.8)

## 2021-12-01 RX ADMIN — IPRATROPIUM BROMIDE AND ALBUTEROL SULFATE SCH UDVIAL: .5; 3 SOLUTION RESPIRATORY (INHALATION) at 06:02

## 2021-12-01 RX ADMIN — IPRATROPIUM BROMIDE AND ALBUTEROL SULFATE SCH UDVIAL: .5; 3 SOLUTION RESPIRATORY (INHALATION) at 23:30

## 2021-12-01 RX ADMIN — SODIUM CHLORIDE SCH ML: 9 INJECTION, SOLUTION INTRAVENOUS at 14:38

## 2021-12-01 RX ADMIN — SODIUM CHLORIDE SCH UNIT: 9 INJECTION, SOLUTION INTRAVENOUS at 16:57

## 2021-12-01 RX ADMIN — IPRATROPIUM BROMIDE AND ALBUTEROL SULFATE SCH UDVIAL: .5; 3 SOLUTION RESPIRATORY (INHALATION) at 11:38

## 2021-12-01 RX ADMIN — MAGNESIUM OXIDE TAB 400 MG (241.3 MG ELEMENTAL MG) SCH MG: 400 (241.3 MG) TAB at 09:27

## 2021-12-01 RX ADMIN — SACUBITRIL AND VALSARTAN SCH EACH: 49; 51 TABLET, FILM COATED ORAL at 09:27

## 2021-12-01 RX ADMIN — FUROSEMIDE SCH MG: 10 INJECTION, SOLUTION INTRAMUSCULAR; INTRAVENOUS at 19:00

## 2021-12-01 RX ADMIN — SODIUM CHLORIDE SCH UNIT: 9 INJECTION, SOLUTION INTRAVENOUS at 20:57

## 2021-12-01 RX ADMIN — IPRATROPIUM BROMIDE AND ALBUTEROL SULFATE SCH UDVIAL: .5; 3 SOLUTION RESPIRATORY (INHALATION) at 18:17

## 2021-12-01 RX ADMIN — ASPIRIN SCH MG: 81 TABLET, CHEWABLE ORAL at 09:27

## 2021-12-01 RX ADMIN — CLOPIDOGREL BISULFATE SCH MG: 75 TABLET, FILM COATED ORAL at 09:29

## 2021-12-01 RX ADMIN — CARVEDILOL SCH MG: 3.12 TABLET, FILM COATED ORAL at 20:56

## 2021-12-01 RX ADMIN — FUROSEMIDE SCH MG: 10 INJECTION INTRAVENOUS at 07:00

## 2021-12-01 RX ADMIN — METHYLPREDNISOLONE SODIUM SUCCINATE SCH MG: 40 INJECTION, POWDER, FOR SOLUTION INTRAMUSCULAR; INTRAVENOUS at 20:58

## 2021-12-01 RX ADMIN — SACUBITRIL AND VALSARTAN SCH EACH: 49; 51 TABLET, FILM COATED ORAL at 20:58

## 2021-12-01 RX ADMIN — AMIODARONE HYDROCHLORIDE SCH MG: 200 TABLET ORAL at 09:27

## 2021-12-01 RX ADMIN — SODIUM CHLORIDE SCH MLS/HR: 0.9 INJECTION, SOLUTION INTRAVENOUS at 03:50

## 2021-12-01 RX ADMIN — ENOXAPARIN SODIUM SCH MG: 40 INJECTION SUBCUTANEOUS at 09:26

## 2021-12-01 RX ADMIN — CARVEDILOL SCH MG: 3.12 TABLET, FILM COATED ORAL at 09:27

## 2021-12-01 RX ADMIN — METHYLPREDNISOLONE SODIUM SUCCINATE SCH MG: 40 INJECTION, POWDER, FOR SOLUTION INTRAMUSCULAR; INTRAVENOUS at 09:26

## 2021-12-01 RX ADMIN — Medication SCH MG: at 09:28

## 2021-12-01 RX ADMIN — SODIUM CHLORIDE SCH UNIT: 9 INJECTION, SOLUTION INTRAVENOUS at 06:34

## 2021-12-01 RX ADMIN — FUROSEMIDE SCH MG: 10 INJECTION, SOLUTION INTRAMUSCULAR; INTRAVENOUS at 21:01

## 2021-12-01 RX ADMIN — IPRATROPIUM BROMIDE AND ALBUTEROL SULFATE SCH UDVIAL: .5; 3 SOLUTION RESPIRATORY (INHALATION) at 00:01

## 2021-12-01 RX ADMIN — PANTOPRAZOLE SODIUM SCH MG: 40 TABLET, DELAYED RELEASE ORAL at 09:00

## 2021-12-01 RX ADMIN — IPRATROPIUM BROMIDE AND ALBUTEROL SULFATE SCH UDVIAL: .5; 3 SOLUTION RESPIRATORY (INHALATION) at 11:39

## 2021-12-01 RX ADMIN — AZITHROMYCIN MONOHYDRATE SCH MG: 500 INJECTION, POWDER, LYOPHILIZED, FOR SOLUTION INTRAVENOUS at 14:38

## 2021-12-01 RX ADMIN — FUROSEMIDE SCH MG: 10 INJECTION INTRAVENOUS at 18:30

## 2021-12-01 RX ADMIN — SODIUM CHLORIDE SCH GM: 9 INJECTION, SOLUTION INTRAVENOUS at 14:38

## 2021-12-01 RX ADMIN — SODIUM CHLORIDE SCH UNIT: 9 INJECTION, SOLUTION INTRAVENOUS at 12:02

## 2021-12-02 VITALS — SYSTOLIC BLOOD PRESSURE: 116 MMHG | DIASTOLIC BLOOD PRESSURE: 54 MMHG

## 2021-12-02 VITALS — SYSTOLIC BLOOD PRESSURE: 141 MMHG | DIASTOLIC BLOOD PRESSURE: 71 MMHG

## 2021-12-02 VITALS — DIASTOLIC BLOOD PRESSURE: 79 MMHG | SYSTOLIC BLOOD PRESSURE: 142 MMHG

## 2021-12-02 VITALS — DIASTOLIC BLOOD PRESSURE: 72 MMHG | SYSTOLIC BLOOD PRESSURE: 141 MMHG

## 2021-12-02 LAB
BUN SERPL-MCNC: 43 MG/DL (ref 7–18)
CHLORIDE SERPL-SCNC: 104 MMOL/L (ref 101–111)
CO2 SERPL-SCNC: 27 MMOL/L (ref 21–32)
CREAT SERPL-MCNC: 1.5 MG/DL (ref 0.5–1.5)
ERYTHROCYTE [DISTWIDTH] IN BLOOD BY AUTOMATED COUNT: 17.1 % (ref 11–15.5)
GFR SERPL CREATININE-BSD FRML MDRD: 49 ML/MIN (ref 60–?)
GLUCOSE SERPL-MCNC: 219 MG/DL (ref 70–105)
HCT VFR BLD AUTO: 33.6 % (ref 42–54)
MCH RBC QN AUTO: 23.7 PG (ref 27–33)
MCHC RBC AUTO-ENTMCNC: 29.5 G/DL (ref 32–36)
MCV RBC AUTO: 80.4 FL (ref 79–99)
NRBC BLD MANUAL-RTO: 0.1 % (ref 0–0.19)
PLATELET # BLD AUTO: 241 K/UL (ref 130–400)
POTASSIUM SERPL-SCNC: 3.9 MMOL/L (ref 3.5–5.1)
RBC # BLD AUTO: 4.18 MIL/UL (ref 4.5–6.2)
SODIUM SERPL-SCNC: 141 MMOL/L (ref 136–145)
WBC # BLD AUTO: 15.5 K/UL (ref 4.8–10.8)

## 2021-12-02 RX ADMIN — SODIUM CHLORIDE SCH UNIT: 9 INJECTION, SOLUTION INTRAVENOUS at 12:19

## 2021-12-02 RX ADMIN — MAGNESIUM OXIDE TAB 400 MG (241.3 MG ELEMENTAL MG) SCH MG: 400 (241.3 MG) TAB at 10:36

## 2021-12-02 RX ADMIN — ASPIRIN SCH MG: 81 TABLET, CHEWABLE ORAL at 10:29

## 2021-12-02 RX ADMIN — METHYLPREDNISOLONE SODIUM SUCCINATE SCH MG: 40 INJECTION, POWDER, FOR SOLUTION INTRAMUSCULAR; INTRAVENOUS at 10:29

## 2021-12-02 RX ADMIN — FUROSEMIDE SCH MG: 10 INJECTION, SOLUTION INTRAMUSCULAR; INTRAVENOUS at 06:16

## 2021-12-02 RX ADMIN — SODIUM CHLORIDE SCH GM: 9 INJECTION, SOLUTION INTRAVENOUS at 15:16

## 2021-12-02 RX ADMIN — SACUBITRIL AND VALSARTAN SCH EACH: 49; 51 TABLET, FILM COATED ORAL at 10:36

## 2021-12-02 RX ADMIN — AMIODARONE HYDROCHLORIDE SCH MG: 200 TABLET ORAL at 10:30

## 2021-12-02 RX ADMIN — IPRATROPIUM BROMIDE AND ALBUTEROL SULFATE SCH UDVIAL: .5; 3 SOLUTION RESPIRATORY (INHALATION) at 11:15

## 2021-12-02 RX ADMIN — SODIUM CHLORIDE SCH UNIT: 9 INJECTION, SOLUTION INTRAVENOUS at 06:14

## 2021-12-02 RX ADMIN — Medication SCH MG: at 10:30

## 2021-12-02 RX ADMIN — SODIUM CHLORIDE SCH ML: 9 INJECTION, SOLUTION INTRAVENOUS at 15:16

## 2021-12-02 RX ADMIN — PANTOPRAZOLE SODIUM SCH MG: 40 TABLET, DELAYED RELEASE ORAL at 10:36

## 2021-12-02 RX ADMIN — IPRATROPIUM BROMIDE AND ALBUTEROL SULFATE SCH UDVIAL: .5; 3 SOLUTION RESPIRATORY (INHALATION) at 06:06

## 2021-12-02 RX ADMIN — CARVEDILOL SCH MG: 3.12 TABLET, FILM COATED ORAL at 10:30

## 2021-12-02 RX ADMIN — AZITHROMYCIN MONOHYDRATE SCH MG: 500 INJECTION, POWDER, LYOPHILIZED, FOR SOLUTION INTRAVENOUS at 15:16

## 2021-12-02 RX ADMIN — ENOXAPARIN SODIUM SCH MG: 40 INJECTION SUBCUTANEOUS at 10:29

## 2021-12-02 RX ADMIN — CLOPIDOGREL BISULFATE SCH MG: 75 TABLET, FILM COATED ORAL at 10:30

## 2022-01-11 ENCOUNTER — HOSPITAL ENCOUNTER (OUTPATIENT)
Dept: HOSPITAL 90 - ENDO | Age: 67
Discharge: HOME | End: 2022-01-11
Attending: INTERNAL MEDICINE
Payer: COMMERCIAL

## 2022-01-11 VITALS — DIASTOLIC BLOOD PRESSURE: 72 MMHG | SYSTOLIC BLOOD PRESSURE: 149 MMHG

## 2022-01-11 VITALS — BODY MASS INDEX: 23.86 KG/M2 | WEIGHT: 152 LBS | HEIGHT: 67 IN

## 2022-01-11 VITALS — SYSTOLIC BLOOD PRESSURE: 136 MMHG | DIASTOLIC BLOOD PRESSURE: 72 MMHG

## 2022-01-11 VITALS — DIASTOLIC BLOOD PRESSURE: 76 MMHG | SYSTOLIC BLOOD PRESSURE: 173 MMHG

## 2022-01-11 VITALS — SYSTOLIC BLOOD PRESSURE: 150 MMHG | DIASTOLIC BLOOD PRESSURE: 71 MMHG

## 2022-01-11 VITALS — SYSTOLIC BLOOD PRESSURE: 148 MMHG | DIASTOLIC BLOOD PRESSURE: 75 MMHG

## 2022-01-11 VITALS — DIASTOLIC BLOOD PRESSURE: 68 MMHG | SYSTOLIC BLOOD PRESSURE: 95 MMHG

## 2022-01-11 VITALS — DIASTOLIC BLOOD PRESSURE: 74 MMHG | SYSTOLIC BLOOD PRESSURE: 98 MMHG

## 2022-01-11 VITALS — SYSTOLIC BLOOD PRESSURE: 152 MMHG | DIASTOLIC BLOOD PRESSURE: 72 MMHG

## 2022-01-11 DIAGNOSIS — Z95.1: ICD-10-CM

## 2022-01-11 DIAGNOSIS — Z12.11: Primary | ICD-10-CM

## 2022-01-11 DIAGNOSIS — I12.9: ICD-10-CM

## 2022-01-11 DIAGNOSIS — E11.40: ICD-10-CM

## 2022-01-11 DIAGNOSIS — K57.30: ICD-10-CM

## 2022-01-11 DIAGNOSIS — I25.2: ICD-10-CM

## 2022-01-11 DIAGNOSIS — E78.5: ICD-10-CM

## 2022-01-11 DIAGNOSIS — Z79.899: ICD-10-CM

## 2022-01-11 DIAGNOSIS — D12.3: ICD-10-CM

## 2022-01-11 DIAGNOSIS — E11.22: ICD-10-CM

## 2022-01-11 DIAGNOSIS — I25.10: ICD-10-CM

## 2022-01-11 PROCEDURE — 45385 COLONOSCOPY W/LESION REMOVAL: CPT

## 2022-01-11 PROCEDURE — 82948 REAGENT STRIP/BLOOD GLUCOSE: CPT

## 2022-01-11 PROCEDURE — 87635 SARS-COV-2 COVID-19 AMP PRB: CPT

## 2022-01-11 PROCEDURE — 93005 ELECTROCARDIOGRAM TRACING: CPT

## 2022-01-11 RX ADMIN — SODIUM CHLORIDE ONE MLS/HR: 0.9 INJECTION, SOLUTION INTRAVENOUS at 08:47

## 2022-04-11 ENCOUNTER — HOSPITAL ENCOUNTER (INPATIENT)
Dept: HOSPITAL 90 - EDH | Age: 67
LOS: 5 days | Discharge: HOME HEALTH | DRG: 264 | End: 2022-04-16
Attending: INTERNAL MEDICINE | Admitting: INTERNAL MEDICINE
Payer: COMMERCIAL

## 2022-04-11 VITALS — SYSTOLIC BLOOD PRESSURE: 161 MMHG | DIASTOLIC BLOOD PRESSURE: 88 MMHG

## 2022-04-11 VITALS — SYSTOLIC BLOOD PRESSURE: 154 MMHG | DIASTOLIC BLOOD PRESSURE: 63 MMHG

## 2022-04-11 VITALS — WEIGHT: 158 LBS | HEIGHT: 66 IN | BODY MASS INDEX: 25.39 KG/M2

## 2022-04-11 VITALS — DIASTOLIC BLOOD PRESSURE: 82 MMHG | SYSTOLIC BLOOD PRESSURE: 162 MMHG

## 2022-04-11 DIAGNOSIS — Z89.612: ICD-10-CM

## 2022-04-11 DIAGNOSIS — Z82.49: ICD-10-CM

## 2022-04-11 DIAGNOSIS — E78.00: ICD-10-CM

## 2022-04-11 DIAGNOSIS — Z95.1: ICD-10-CM

## 2022-04-11 DIAGNOSIS — E11.621: ICD-10-CM

## 2022-04-11 DIAGNOSIS — Z87.01: ICD-10-CM

## 2022-04-11 DIAGNOSIS — Z88.8: ICD-10-CM

## 2022-04-11 DIAGNOSIS — I25.10: ICD-10-CM

## 2022-04-11 DIAGNOSIS — E11.40: ICD-10-CM

## 2022-04-11 DIAGNOSIS — Z83.3: ICD-10-CM

## 2022-04-11 DIAGNOSIS — F32.A: ICD-10-CM

## 2022-04-11 DIAGNOSIS — I96: ICD-10-CM

## 2022-04-11 DIAGNOSIS — L97.419: ICD-10-CM

## 2022-04-11 DIAGNOSIS — B19.20: ICD-10-CM

## 2022-04-11 DIAGNOSIS — J40: ICD-10-CM

## 2022-04-11 DIAGNOSIS — I25.5: ICD-10-CM

## 2022-04-11 DIAGNOSIS — E78.5: ICD-10-CM

## 2022-04-11 DIAGNOSIS — C64.9: ICD-10-CM

## 2022-04-11 DIAGNOSIS — I50.22: ICD-10-CM

## 2022-04-11 DIAGNOSIS — E11.52: Primary | ICD-10-CM

## 2022-04-11 DIAGNOSIS — I11.0: ICD-10-CM

## 2022-04-11 DIAGNOSIS — Z79.899: ICD-10-CM

## 2022-04-11 DIAGNOSIS — L03.115: ICD-10-CM

## 2022-04-11 DIAGNOSIS — Z95.810: ICD-10-CM

## 2022-04-11 LAB
ALBUMIN SERPL-MCNC: 2.5 G/DL (ref 3.5–5)
ALT SERPL-CCNC: 21 U/L (ref 12–78)
APTT PPP: 26.2 SEC (ref 26.3–35.5)
AST SERPL-CCNC: 23 U/L (ref 10–37)
BASOPHILS NFR BLD AUTO: 0.5 % (ref 0–5)
BILIRUB SERPL-MCNC: 0.4 MG/DL (ref 0.2–1)
BNP SERPL-MCNC: 2670 PG/ML (ref 0–100)
BUN SERPL-MCNC: 16 MG/DL (ref 7–18)
CHLORIDE SERPL-SCNC: 103 MMOL/L (ref 101–111)
CO2 SERPL-SCNC: 28 MMOL/L (ref 21–32)
CREAT SERPL-MCNC: 1.2 MG/DL (ref 0.5–1.5)
CRP SERPL HS-MCNC: 72.8 MG/L (ref 0–9)
EOSINOPHIL NFR BLD AUTO: 1.9 % (ref 0–8)
ERYTHROCYTE [DISTWIDTH] IN BLOOD BY AUTOMATED COUNT: 16 % (ref 11–15.5)
GFR SERPL CREATININE-BSD FRML MDRD: 64 ML/MIN (ref 60–?)
GLUCOSE SERPL-MCNC: 191 MG/DL (ref 70–105)
HCT VFR BLD AUTO: 39.3 % (ref 42–54)
INR PPP: 1.04 (ref 0.85–1.15)
LYMPHOCYTES NFR SPEC AUTO: 11.6 % (ref 21–51)
MAGNESIUM SERPL-MCNC: 2.1 MG/DL (ref 1.8–2.4)
MCH RBC QN AUTO: 25.8 PG (ref 27–33)
MCHC RBC AUTO-ENTMCNC: 31.8 G/DL (ref 32–36)
MCV RBC AUTO: 81 FL (ref 79–99)
MONOCYTES NFR BLD AUTO: 6.5 % (ref 3–13)
NEUTROPHILS NFR BLD AUTO: 79 % (ref 40–77)
NRBC BLD MANUAL-RTO: 0 % (ref 0–0.19)
PLATELET # BLD AUTO: 283 K/UL (ref 130–400)
POTASSIUM SERPL-SCNC: 3.2 MMOL/L (ref 3.5–5.1)
PROT SERPL-MCNC: 7.6 G/DL (ref 6–8.3)
PROTHROMBIN TIME: 11.3 SEC (ref 9.6–11.6)
RBC # BLD AUTO: 4.85 MIL/UL (ref 4.5–6.2)
SODIUM SERPL-SCNC: 139 MMOL/L (ref 136–145)
WBC # BLD AUTO: 11 K/UL (ref 4.8–10.8)

## 2022-04-11 PROCEDURE — 97039 UNLISTED MODALITY: CPT

## 2022-04-11 PROCEDURE — 87186 SC STD MICRODIL/AGAR DIL: CPT

## 2022-04-11 PROCEDURE — 85651 RBC SED RATE NONAUTOMATED: CPT

## 2022-04-11 PROCEDURE — 36415 COLL VENOUS BLD VENIPUNCTURE: CPT

## 2022-04-11 PROCEDURE — 85025 COMPLETE CBC W/AUTO DIFF WBC: CPT

## 2022-04-11 PROCEDURE — 71045 X-RAY EXAM CHEST 1 VIEW: CPT

## 2022-04-11 PROCEDURE — 83880 ASSAY OF NATRIURETIC PEPTIDE: CPT

## 2022-04-11 PROCEDURE — 80061 LIPID PANEL: CPT

## 2022-04-11 PROCEDURE — 73630 X-RAY EXAM OF FOOT: CPT

## 2022-04-11 PROCEDURE — 85610 PROTHROMBIN TIME: CPT

## 2022-04-11 PROCEDURE — 82550 ASSAY OF CK (CPK): CPT

## 2022-04-11 PROCEDURE — 75635 CT ANGIO ABDOMINAL ARTERIES: CPT

## 2022-04-11 PROCEDURE — 93926 LOWER EXTREMITY STUDY: CPT

## 2022-04-11 PROCEDURE — 83735 ASSAY OF MAGNESIUM: CPT

## 2022-04-11 PROCEDURE — 86140 C-REACTIVE PROTEIN: CPT

## 2022-04-11 PROCEDURE — 81003 URINALYSIS AUTO W/O SCOPE: CPT

## 2022-04-11 PROCEDURE — 94664 DEMO&/EVAL PT USE INHALER: CPT

## 2022-04-11 PROCEDURE — 85027 COMPLETE CBC AUTOMATED: CPT

## 2022-04-11 PROCEDURE — 80048 BASIC METABOLIC PNL TOTAL CA: CPT

## 2022-04-11 PROCEDURE — 87071 CULTURE AEROBIC QUANT OTHER: CPT

## 2022-04-11 PROCEDURE — 80053 COMPREHEN METABOLIC PANEL: CPT

## 2022-04-11 PROCEDURE — 87205 SMEAR GRAM STAIN: CPT

## 2022-04-11 PROCEDURE — 84145 PROCALCITONIN (PCT): CPT

## 2022-04-11 PROCEDURE — 99156 MOD SED OTH PHYS/QHP 5/>YRS: CPT

## 2022-04-11 PROCEDURE — 94640 AIRWAY INHALATION TREATMENT: CPT

## 2022-04-11 PROCEDURE — 99157 MOD SED OTHER PHYS/QHP EA: CPT

## 2022-04-11 PROCEDURE — 85730 THROMBOPLASTIN TIME PARTIAL: CPT

## 2022-04-11 PROCEDURE — 82948 REAGENT STRIP/BLOOD GLUCOSE: CPT

## 2022-04-11 PROCEDURE — 87077 CULTURE AEROBIC IDENTIFY: CPT

## 2022-04-11 RX ADMIN — PANTOPRAZOLE SODIUM SCH MG: 40 TABLET, DELAYED RELEASE ORAL at 18:19

## 2022-04-11 RX ADMIN — SACUBITRIL AND VALSARTAN SCH EACH: 49; 51 TABLET, FILM COATED ORAL at 20:28

## 2022-04-11 RX ADMIN — CEFTRIAXONE SODIUM SCH GM: 2 INJECTION, POWDER, FOR SOLUTION INTRAMUSCULAR; INTRAVENOUS at 20:29

## 2022-04-11 RX ADMIN — SODIUM CHLORIDE SCH UNIT: 9 INJECTION, SOLUTION INTRAVENOUS at 21:03

## 2022-04-11 RX ADMIN — BUDESONIDE SCH MG: 0.5 SUSPENSION RESPIRATORY (INHALATION) at 18:50

## 2022-04-11 RX ADMIN — CARVEDILOL SCH MG: 3.12 TABLET, FILM COATED ORAL at 20:29

## 2022-04-12 VITALS — SYSTOLIC BLOOD PRESSURE: 171 MMHG | DIASTOLIC BLOOD PRESSURE: 80 MMHG

## 2022-04-12 VITALS — DIASTOLIC BLOOD PRESSURE: 76 MMHG | SYSTOLIC BLOOD PRESSURE: 157 MMHG

## 2022-04-12 VITALS — SYSTOLIC BLOOD PRESSURE: 151 MMHG | DIASTOLIC BLOOD PRESSURE: 101 MMHG

## 2022-04-12 VITALS — SYSTOLIC BLOOD PRESSURE: 183 MMHG | DIASTOLIC BLOOD PRESSURE: 76 MMHG

## 2022-04-12 VITALS — SYSTOLIC BLOOD PRESSURE: 156 MMHG | DIASTOLIC BLOOD PRESSURE: 64 MMHG

## 2022-04-12 VITALS — SYSTOLIC BLOOD PRESSURE: 170 MMHG | DIASTOLIC BLOOD PRESSURE: 70 MMHG

## 2022-04-12 LAB
BASOPHILS NFR BLD AUTO: 0.3 % (ref 0–5)
BUN SERPL-MCNC: 16 MG/DL (ref 7–18)
CHLORIDE SERPL-SCNC: 103 MMOL/L (ref 101–111)
CHOLEST SERPL-MCNC: 98 MG/DL (ref ?–200)
CO2 SERPL-SCNC: 26 MMOL/L (ref 21–32)
CREAT SERPL-MCNC: 1.4 MG/DL (ref 0.5–1.5)
EOSINOPHIL NFR BLD AUTO: 2.3 % (ref 0–8)
ERYTHROCYTE [DISTWIDTH] IN BLOOD BY AUTOMATED COUNT: 15.9 % (ref 11–15.5)
GFR SERPL CREATININE-BSD FRML MDRD: 53 ML/MIN (ref 60–?)
GLUCOSE SERPL-MCNC: 169 MG/DL (ref 70–105)
HCT VFR BLD AUTO: 35.5 % (ref 42–54)
HDLC SERPL-MCNC: 22 MG/DL (ref 29–71)
LDLC SERPL CALC-MCNC: 57 MG/DL (ref 0–99)
LYMPHOCYTES NFR SPEC AUTO: 15.2 % (ref 21–51)
MAGNESIUM SERPL-MCNC: 1.9 MG/DL (ref 1.8–2.4)
MCH RBC QN AUTO: 24.5 PG (ref 27–33)
MCHC RBC AUTO-ENTMCNC: 30.7 G/DL (ref 32–36)
MCV RBC AUTO: 79.8 FL (ref 79–99)
MONOCYTES NFR BLD AUTO: 6.9 % (ref 3–13)
NEUTROPHILS NFR BLD AUTO: 74.9 % (ref 40–77)
NRBC BLD MANUAL-RTO: 0 % (ref 0–0.19)
PH UR STRIP: 7 [PH] (ref 5–8)
PLATELET # BLD AUTO: 263 K/UL (ref 130–400)
POTASSIUM SERPL-SCNC: 3.3 MMOL/L (ref 3.5–5.1)
PROT UR QL STRIP: (no result) MG/DL
RBC # BLD AUTO: 4.45 MIL/UL (ref 4.5–6.2)
SODIUM SERPL-SCNC: 139 MMOL/L (ref 136–145)
SP GR UR STRIP: 1.01 (ref 1–1.03)
TRIGL SERPL-MCNC: 210 MG/DL (ref 30–200)
UROBILINOGEN UR STRIP-MCNC: 2 MG/DL (ref 0.2–1)
WBC # BLD AUTO: 10.5 K/UL (ref 4.8–10.8)

## 2022-04-12 RX ADMIN — BUDESONIDE SCH MG: 0.5 SUSPENSION RESPIRATORY (INHALATION) at 18:23

## 2022-04-12 RX ADMIN — FUROSEMIDE SCH MG: 40 TABLET ORAL at 08:55

## 2022-04-12 RX ADMIN — SODIUM CHLORIDE SCH UNIT: 9 INJECTION, SOLUTION INTRAVENOUS at 16:30

## 2022-04-12 RX ADMIN — AMIODARONE HYDROCHLORIDE SCH MG: 200 TABLET ORAL at 08:56

## 2022-04-12 RX ADMIN — CARVEDILOL SCH MG: 3.12 TABLET, FILM COATED ORAL at 20:15

## 2022-04-12 RX ADMIN — SACUBITRIL AND VALSARTAN SCH EACH: 49; 51 TABLET, FILM COATED ORAL at 08:55

## 2022-04-12 RX ADMIN — ASPIRIN TAB DELAYED RELEASE 81 MG SCH MG: 81 TABLET DELAYED RESPONSE at 08:56

## 2022-04-12 RX ADMIN — SODIUM CHLORIDE SCH UNIT: 9 INJECTION, SOLUTION INTRAVENOUS at 12:37

## 2022-04-12 RX ADMIN — BUDESONIDE SCH MG: 0.5 SUSPENSION RESPIRATORY (INHALATION) at 06:44

## 2022-04-12 RX ADMIN — CEFTRIAXONE SODIUM SCH GM: 2 INJECTION, POWDER, FOR SOLUTION INTRAMUSCULAR; INTRAVENOUS at 20:12

## 2022-04-12 RX ADMIN — Medication SCH MG: at 08:54

## 2022-04-12 RX ADMIN — CLOPIDOGREL BISULFATE SCH MG: 75 TABLET, FILM COATED ORAL at 08:55

## 2022-04-12 RX ADMIN — MAGNESIUM OXIDE TAB 400 MG (241.3 MG ELEMENTAL MG) SCH MG: 400 (241.3 MG) TAB at 08:54

## 2022-04-12 RX ADMIN — SODIUM CHLORIDE SCH UNIT: 9 INJECTION, SOLUTION INTRAVENOUS at 20:22

## 2022-04-12 RX ADMIN — PANTOPRAZOLE SODIUM SCH MG: 40 TABLET, DELAYED RELEASE ORAL at 08:55

## 2022-04-12 RX ADMIN — SODIUM CHLORIDE SCH UNIT: 9 INJECTION, SOLUTION INTRAVENOUS at 05:48

## 2022-04-12 RX ADMIN — CARVEDILOL SCH MG: 3.12 TABLET, FILM COATED ORAL at 08:55

## 2022-04-12 RX ADMIN — SACUBITRIL AND VALSARTAN SCH EACH: 49; 51 TABLET, FILM COATED ORAL at 20:12

## 2022-04-13 VITALS — DIASTOLIC BLOOD PRESSURE: 71 MMHG | SYSTOLIC BLOOD PRESSURE: 172 MMHG

## 2022-04-13 VITALS — SYSTOLIC BLOOD PRESSURE: 142 MMHG | DIASTOLIC BLOOD PRESSURE: 65 MMHG

## 2022-04-13 VITALS — SYSTOLIC BLOOD PRESSURE: 141 MMHG | DIASTOLIC BLOOD PRESSURE: 60 MMHG

## 2022-04-13 VITALS — DIASTOLIC BLOOD PRESSURE: 73 MMHG | SYSTOLIC BLOOD PRESSURE: 156 MMHG

## 2022-04-13 VITALS — SYSTOLIC BLOOD PRESSURE: 131 MMHG | DIASTOLIC BLOOD PRESSURE: 58 MMHG

## 2022-04-13 VITALS — SYSTOLIC BLOOD PRESSURE: 164 MMHG | DIASTOLIC BLOOD PRESSURE: 65 MMHG

## 2022-04-13 VITALS — SYSTOLIC BLOOD PRESSURE: 165 MMHG | DIASTOLIC BLOOD PRESSURE: 66 MMHG

## 2022-04-13 VITALS — DIASTOLIC BLOOD PRESSURE: 62 MMHG | SYSTOLIC BLOOD PRESSURE: 146 MMHG

## 2022-04-13 VITALS — SYSTOLIC BLOOD PRESSURE: 152 MMHG | DIASTOLIC BLOOD PRESSURE: 59 MMHG

## 2022-04-13 VITALS — SYSTOLIC BLOOD PRESSURE: 154 MMHG | DIASTOLIC BLOOD PRESSURE: 79 MMHG

## 2022-04-13 VITALS — DIASTOLIC BLOOD PRESSURE: 72 MMHG | SYSTOLIC BLOOD PRESSURE: 167 MMHG

## 2022-04-13 VITALS — DIASTOLIC BLOOD PRESSURE: 80 MMHG | SYSTOLIC BLOOD PRESSURE: 162 MMHG

## 2022-04-13 VITALS — SYSTOLIC BLOOD PRESSURE: 165 MMHG | DIASTOLIC BLOOD PRESSURE: 73 MMHG

## 2022-04-13 LAB
APTT PPP: 55.5 SEC (ref 26.3–35.5)
BUN SERPL-MCNC: 16 MG/DL (ref 7–18)
CHLORIDE SERPL-SCNC: 102 MMOL/L (ref 101–111)
CO2 SERPL-SCNC: 27 MMOL/L (ref 21–32)
CREAT SERPL-MCNC: 1.3 MG/DL (ref 0.5–1.5)
ERYTHROCYTE [DISTWIDTH] IN BLOOD BY AUTOMATED COUNT: 15.9 % (ref 11–15.5)
GFR SERPL CREATININE-BSD FRML MDRD: 58 ML/MIN (ref 60–?)
GLUCOSE SERPL-MCNC: 198 MG/DL (ref 70–105)
HCT VFR BLD AUTO: 35.9 % (ref 42–54)
INR PPP: 1.09 (ref 0.85–1.15)
MCH RBC QN AUTO: 25.3 PG (ref 27–33)
MCHC RBC AUTO-ENTMCNC: 31.8 G/DL (ref 32–36)
MCV RBC AUTO: 79.8 FL (ref 79–99)
NRBC BLD MANUAL-RTO: 0 % (ref 0–0.19)
PLATELET # BLD AUTO: 264 K/UL (ref 130–400)
POTASSIUM SERPL-SCNC: 3 MMOL/L (ref 3.5–5.1)
PROTHROMBIN TIME: 11.8 SEC (ref 9.6–11.6)
RBC # BLD AUTO: 4.5 MIL/UL (ref 4.5–6.2)
SODIUM SERPL-SCNC: 138 MMOL/L (ref 136–145)
WBC # BLD AUTO: 11.4 K/UL (ref 4.8–10.8)

## 2022-04-13 PROCEDURE — B41G1ZZ FLUOROSCOPY OF LEFT LOWER EXTREMITY ARTERIES USING LOW OSMOLAR CONTRAST: ICD-10-PCS | Performed by: INTERNAL MEDICINE

## 2022-04-13 RX ADMIN — FUROSEMIDE SCH MG: 40 TABLET ORAL at 08:42

## 2022-04-13 RX ADMIN — SACUBITRIL AND VALSARTAN SCH EACH: 49; 51 TABLET, FILM COATED ORAL at 09:58

## 2022-04-13 RX ADMIN — SACUBITRIL AND VALSARTAN SCH EACH: 49; 51 TABLET, FILM COATED ORAL at 19:46

## 2022-04-13 RX ADMIN — PANTOPRAZOLE SODIUM SCH MG: 40 TABLET, DELAYED RELEASE ORAL at 08:44

## 2022-04-13 RX ADMIN — MAGNESIUM OXIDE TAB 400 MG (241.3 MG ELEMENTAL MG) SCH MG: 400 (241.3 MG) TAB at 08:46

## 2022-04-13 RX ADMIN — ASPIRIN TAB DELAYED RELEASE 81 MG SCH MG: 81 TABLET DELAYED RESPONSE at 09:00

## 2022-04-13 RX ADMIN — CLOPIDOGREL BISULFATE SCH MG: 75 TABLET, FILM COATED ORAL at 09:00

## 2022-04-13 RX ADMIN — CEFTRIAXONE SODIUM SCH GM: 2 INJECTION, POWDER, FOR SOLUTION INTRAMUSCULAR; INTRAVENOUS at 19:47

## 2022-04-13 RX ADMIN — POTASSIUM CHLORIDE PRN MEQ: 1500 TABLET, EXTENDED RELEASE ORAL at 14:47

## 2022-04-13 RX ADMIN — CARVEDILOL SCH MG: 3.12 TABLET, FILM COATED ORAL at 04:57

## 2022-04-13 RX ADMIN — AMIODARONE HYDROCHLORIDE SCH MG: 200 TABLET ORAL at 08:42

## 2022-04-13 RX ADMIN — SODIUM CHLORIDE SCH UNIT: 9 INJECTION, SOLUTION INTRAVENOUS at 16:30

## 2022-04-13 RX ADMIN — BUDESONIDE SCH MG: 0.5 SUSPENSION RESPIRATORY (INHALATION) at 06:43

## 2022-04-13 RX ADMIN — POTASSIUM CHLORIDE PRN MEQ: 1500 TABLET, EXTENDED RELEASE ORAL at 06:12

## 2022-04-13 RX ADMIN — BUDESONIDE SCH MG: 0.5 SUSPENSION RESPIRATORY (INHALATION) at 18:54

## 2022-04-13 RX ADMIN — SODIUM CHLORIDE SCH UNIT: 9 INJECTION, SOLUTION INTRAVENOUS at 12:39

## 2022-04-13 RX ADMIN — SODIUM CHLORIDE SCH UNIT: 9 INJECTION, SOLUTION INTRAVENOUS at 06:31

## 2022-04-13 RX ADMIN — CARVEDILOL SCH MG: 3.12 TABLET, FILM COATED ORAL at 19:47

## 2022-04-13 RX ADMIN — Medication SCH MG: at 08:42

## 2022-04-13 RX ADMIN — POTASSIUM CHLORIDE PRN MEQ: 1500 TABLET, EXTENDED RELEASE ORAL at 10:32

## 2022-04-14 VITALS — DIASTOLIC BLOOD PRESSURE: 67 MMHG | SYSTOLIC BLOOD PRESSURE: 146 MMHG

## 2022-04-14 VITALS — DIASTOLIC BLOOD PRESSURE: 66 MMHG | SYSTOLIC BLOOD PRESSURE: 158 MMHG

## 2022-04-14 VITALS — DIASTOLIC BLOOD PRESSURE: 56 MMHG | SYSTOLIC BLOOD PRESSURE: 141 MMHG

## 2022-04-14 VITALS — SYSTOLIC BLOOD PRESSURE: 141 MMHG | DIASTOLIC BLOOD PRESSURE: 68 MMHG

## 2022-04-14 VITALS — DIASTOLIC BLOOD PRESSURE: 63 MMHG | SYSTOLIC BLOOD PRESSURE: 154 MMHG

## 2022-04-14 VITALS — SYSTOLIC BLOOD PRESSURE: 156 MMHG | DIASTOLIC BLOOD PRESSURE: 62 MMHG

## 2022-04-14 VITALS — SYSTOLIC BLOOD PRESSURE: 105 MMHG | DIASTOLIC BLOOD PRESSURE: 50 MMHG

## 2022-04-14 LAB
BUN SERPL-MCNC: 15 MG/DL (ref 7–18)
CHLORIDE SERPL-SCNC: 100 MMOL/L (ref 101–111)
CO2 SERPL-SCNC: 27 MMOL/L (ref 21–32)
CREAT SERPL-MCNC: 1.3 MG/DL (ref 0.5–1.5)
ERYTHROCYTE [DISTWIDTH] IN BLOOD BY AUTOMATED COUNT: 15.9 % (ref 11–15.5)
GFR SERPL CREATININE-BSD FRML MDRD: 58 ML/MIN (ref 60–?)
GLUCOSE SERPL-MCNC: 182 MG/DL (ref 70–105)
HCT VFR BLD AUTO: 41.4 % (ref 42–54)
MCH RBC QN AUTO: 24.7 PG (ref 27–33)
MCHC RBC AUTO-ENTMCNC: 30.4 G/DL (ref 32–36)
MCV RBC AUTO: 81 FL (ref 79–99)
NRBC BLD MANUAL-RTO: 0 % (ref 0–0.19)
PLATELET # BLD AUTO: 305 K/UL (ref 130–400)
POTASSIUM SERPL-SCNC: 3.9 MMOL/L (ref 3.5–5.1)
RBC # BLD AUTO: 5.11 MIL/UL (ref 4.5–6.2)
RBC MORPH BLD: (no result)
SODIUM SERPL-SCNC: 135 MMOL/L (ref 136–145)
WBC # BLD AUTO: 10.1 K/UL (ref 4.8–10.8)

## 2022-04-14 RX ADMIN — FUROSEMIDE SCH MG: 40 TABLET ORAL at 09:13

## 2022-04-14 RX ADMIN — SODIUM CHLORIDE SCH UNIT: 9 INJECTION, SOLUTION INTRAVENOUS at 17:23

## 2022-04-14 RX ADMIN — CEFTRIAXONE SODIUM SCH GM: 2 INJECTION, POWDER, FOR SOLUTION INTRAMUSCULAR; INTRAVENOUS at 20:21

## 2022-04-14 RX ADMIN — MAGNESIUM OXIDE TAB 400 MG (241.3 MG ELEMENTAL MG) SCH MG: 400 (241.3 MG) TAB at 09:13

## 2022-04-14 RX ADMIN — PANTOPRAZOLE SODIUM SCH MG: 40 TABLET, DELAYED RELEASE ORAL at 09:13

## 2022-04-14 RX ADMIN — SACUBITRIL AND VALSARTAN SCH EACH: 49; 51 TABLET, FILM COATED ORAL at 09:14

## 2022-04-14 RX ADMIN — ASPIRIN TAB DELAYED RELEASE 81 MG SCH MG: 81 TABLET DELAYED RESPONSE at 09:12

## 2022-04-14 RX ADMIN — Medication SCH MG: at 09:14

## 2022-04-14 RX ADMIN — CARVEDILOL SCH MG: 3.12 TABLET, FILM COATED ORAL at 09:13

## 2022-04-14 RX ADMIN — CARVEDILOL SCH MG: 3.12 TABLET, FILM COATED ORAL at 20:21

## 2022-04-14 RX ADMIN — SODIUM CHLORIDE SCH UNIT: 9 INJECTION, SOLUTION INTRAVENOUS at 12:22

## 2022-04-14 RX ADMIN — CLOPIDOGREL BISULFATE SCH MG: 75 TABLET, FILM COATED ORAL at 09:14

## 2022-04-14 RX ADMIN — SODIUM CHLORIDE SCH UNIT: 9 INJECTION, SOLUTION INTRAVENOUS at 06:18

## 2022-04-14 RX ADMIN — AMIODARONE HYDROCHLORIDE SCH MG: 200 TABLET ORAL at 09:14

## 2022-04-14 RX ADMIN — BUDESONIDE SCH MG: 0.5 SUSPENSION RESPIRATORY (INHALATION) at 18:43

## 2022-04-14 RX ADMIN — BUDESONIDE SCH MG: 0.5 SUSPENSION RESPIRATORY (INHALATION) at 06:54

## 2022-04-14 RX ADMIN — SODIUM CHLORIDE SCH UNIT: 9 INJECTION, SOLUTION INTRAVENOUS at 20:22

## 2022-04-14 RX ADMIN — SACUBITRIL AND VALSARTAN SCH EACH: 49; 51 TABLET, FILM COATED ORAL at 20:20

## 2022-04-15 VITALS — SYSTOLIC BLOOD PRESSURE: 114 MMHG | DIASTOLIC BLOOD PRESSURE: 41 MMHG

## 2022-04-15 VITALS — SYSTOLIC BLOOD PRESSURE: 120 MMHG | DIASTOLIC BLOOD PRESSURE: 49 MMHG

## 2022-04-15 VITALS — SYSTOLIC BLOOD PRESSURE: 153 MMHG | DIASTOLIC BLOOD PRESSURE: 59 MMHG

## 2022-04-15 VITALS — SYSTOLIC BLOOD PRESSURE: 121 MMHG | DIASTOLIC BLOOD PRESSURE: 58 MMHG

## 2022-04-15 VITALS — SYSTOLIC BLOOD PRESSURE: 121 MMHG | DIASTOLIC BLOOD PRESSURE: 55 MMHG

## 2022-04-15 VITALS — SYSTOLIC BLOOD PRESSURE: 133 MMHG | DIASTOLIC BLOOD PRESSURE: 55 MMHG

## 2022-04-15 VITALS — SYSTOLIC BLOOD PRESSURE: 132 MMHG | DIASTOLIC BLOOD PRESSURE: 54 MMHG

## 2022-04-15 LAB
BASOPHILS NFR BLD AUTO: 0.3 % (ref 0–5)
EOSINOPHIL NFR BLD AUTO: 2.1 % (ref 0–8)
ERYTHROCYTE [DISTWIDTH] IN BLOOD BY AUTOMATED COUNT: 16 % (ref 11–15.5)
HCT VFR BLD AUTO: 38 % (ref 42–54)
LYMPHOCYTES NFR SPEC AUTO: 11.2 % (ref 21–51)
MCH RBC QN AUTO: 25.4 PG (ref 27–33)
MCHC RBC AUTO-ENTMCNC: 31.6 G/DL (ref 32–36)
MCV RBC AUTO: 80.5 FL (ref 79–99)
MONOCYTES NFR BLD AUTO: 6.6 % (ref 3–13)
NEUTROPHILS NFR BLD AUTO: 79.2 % (ref 40–77)
NRBC BLD MANUAL-RTO: 0 % (ref 0–0.19)
PLATELET # BLD AUTO: 307 K/UL (ref 130–400)
RBC # BLD AUTO: 4.72 MIL/UL (ref 4.5–6.2)
WBC # BLD AUTO: 10.2 K/UL (ref 4.8–10.8)

## 2022-04-15 PROCEDURE — 0JBP0ZZ EXCISION OF LEFT LOWER LEG SUBCUTANEOUS TISSUE AND FASCIA, OPEN APPROACH: ICD-10-PCS | Performed by: FAMILY MEDICINE

## 2022-04-15 RX ADMIN — SODIUM CHLORIDE SCH UNIT: 9 INJECTION, SOLUTION INTRAVENOUS at 13:13

## 2022-04-15 RX ADMIN — CLOPIDOGREL BISULFATE SCH MG: 75 TABLET, FILM COATED ORAL at 10:51

## 2022-04-15 RX ADMIN — PANTOPRAZOLE SODIUM SCH MG: 40 TABLET, DELAYED RELEASE ORAL at 10:52

## 2022-04-15 RX ADMIN — Medication SCH MG: at 10:51

## 2022-04-15 RX ADMIN — SACUBITRIL AND VALSARTAN SCH EACH: 49; 51 TABLET, FILM COATED ORAL at 10:51

## 2022-04-15 RX ADMIN — MAGNESIUM OXIDE TAB 400 MG (241.3 MG ELEMENTAL MG) SCH MG: 400 (241.3 MG) TAB at 10:51

## 2022-04-15 RX ADMIN — SODIUM CHLORIDE SCH UNIT: 9 INJECTION, SOLUTION INTRAVENOUS at 22:46

## 2022-04-15 RX ADMIN — SACUBITRIL AND VALSARTAN SCH EACH: 49; 51 TABLET, FILM COATED ORAL at 21:08

## 2022-04-15 RX ADMIN — CARVEDILOL SCH MG: 3.12 TABLET, FILM COATED ORAL at 10:52

## 2022-04-15 RX ADMIN — BUDESONIDE SCH MG: 0.5 SUSPENSION RESPIRATORY (INHALATION) at 17:20

## 2022-04-15 RX ADMIN — SODIUM CHLORIDE SCH UNIT: 9 INJECTION, SOLUTION INTRAVENOUS at 17:31

## 2022-04-15 RX ADMIN — CARVEDILOL SCH MG: 3.12 TABLET, FILM COATED ORAL at 21:09

## 2022-04-15 RX ADMIN — AMIODARONE HYDROCHLORIDE SCH MG: 200 TABLET ORAL at 10:58

## 2022-04-15 RX ADMIN — FUROSEMIDE SCH MG: 40 TABLET ORAL at 10:52

## 2022-04-15 RX ADMIN — CEFTRIAXONE SODIUM SCH GM: 2 INJECTION, POWDER, FOR SOLUTION INTRAMUSCULAR; INTRAVENOUS at 21:08

## 2022-04-15 RX ADMIN — ASPIRIN TAB DELAYED RELEASE 81 MG SCH MG: 81 TABLET DELAYED RESPONSE at 10:51

## 2022-04-15 RX ADMIN — SODIUM CHLORIDE SCH UNIT: 9 INJECTION, SOLUTION INTRAVENOUS at 06:13

## 2022-04-15 RX ADMIN — BUDESONIDE SCH MG: 0.5 SUSPENSION RESPIRATORY (INHALATION) at 06:49

## 2022-04-16 VITALS — DIASTOLIC BLOOD PRESSURE: 72 MMHG | SYSTOLIC BLOOD PRESSURE: 154 MMHG

## 2022-04-16 VITALS — DIASTOLIC BLOOD PRESSURE: 58 MMHG | SYSTOLIC BLOOD PRESSURE: 131 MMHG

## 2022-04-16 VITALS — SYSTOLIC BLOOD PRESSURE: 152 MMHG | DIASTOLIC BLOOD PRESSURE: 70 MMHG

## 2022-04-16 LAB
BUN SERPL-MCNC: 31 MG/DL (ref 7–18)
CHLORIDE SERPL-SCNC: 100 MMOL/L (ref 101–111)
CO2 SERPL-SCNC: 30 MMOL/L (ref 21–32)
CREAT SERPL-MCNC: 1.7 MG/DL (ref 0.5–1.5)
ERYTHROCYTE [DISTWIDTH] IN BLOOD BY AUTOMATED COUNT: 15.9 % (ref 11–15.5)
GFR SERPL CREATININE-BSD FRML MDRD: 43 ML/MIN (ref 60–?)
GLUCOSE SERPL-MCNC: 166 MG/DL (ref 70–105)
HCT VFR BLD AUTO: 38.1 % (ref 42–54)
MCH RBC QN AUTO: 24.9 PG (ref 27–33)
MCHC RBC AUTO-ENTMCNC: 30.2 G/DL (ref 32–36)
MCV RBC AUTO: 82.5 FL (ref 79–99)
NRBC BLD MANUAL-RTO: 0 % (ref 0–0.19)
PLATELET # BLD AUTO: 293 K/UL (ref 130–400)
POTASSIUM SERPL-SCNC: 4.7 MMOL/L (ref 3.5–5.1)
RBC # BLD AUTO: 4.62 MIL/UL (ref 4.5–6.2)
SODIUM SERPL-SCNC: 136 MMOL/L (ref 136–145)
WBC # BLD AUTO: 8.4 K/UL (ref 4.8–10.8)

## 2022-04-16 RX ADMIN — SODIUM CHLORIDE SCH UNIT: 9 INJECTION, SOLUTION INTRAVENOUS at 12:29

## 2022-04-16 RX ADMIN — ASPIRIN TAB DELAYED RELEASE 81 MG SCH MG: 81 TABLET DELAYED RESPONSE at 10:36

## 2022-04-16 RX ADMIN — FUROSEMIDE SCH MG: 40 TABLET ORAL at 10:37

## 2022-04-16 RX ADMIN — Medication SCH MG: at 10:37

## 2022-04-16 RX ADMIN — AMIODARONE HYDROCHLORIDE SCH MG: 200 TABLET ORAL at 10:36

## 2022-04-16 RX ADMIN — CARVEDILOL SCH MG: 3.12 TABLET, FILM COATED ORAL at 10:38

## 2022-04-16 RX ADMIN — BUDESONIDE SCH MG: 0.5 SUSPENSION RESPIRATORY (INHALATION) at 06:57

## 2022-04-16 RX ADMIN — MAGNESIUM OXIDE TAB 400 MG (241.3 MG ELEMENTAL MG) SCH MG: 400 (241.3 MG) TAB at 10:37

## 2022-04-16 RX ADMIN — SODIUM CHLORIDE SCH UNIT: 9 INJECTION, SOLUTION INTRAVENOUS at 06:40

## 2022-04-16 RX ADMIN — CLOPIDOGREL BISULFATE SCH MG: 75 TABLET, FILM COATED ORAL at 10:37

## 2022-04-16 RX ADMIN — PANTOPRAZOLE SODIUM SCH MG: 40 TABLET, DELAYED RELEASE ORAL at 10:37

## 2022-04-16 RX ADMIN — SACUBITRIL AND VALSARTAN SCH EACH: 49; 51 TABLET, FILM COATED ORAL at 10:37

## 2022-11-01 ENCOUNTER — HOSPITAL ENCOUNTER (OUTPATIENT)
Dept: HOSPITAL 90 - RAH | Age: 67
Discharge: HOME | End: 2022-11-01
Attending: UROLOGY
Payer: COMMERCIAL

## 2022-11-01 DIAGNOSIS — C64.2: Primary | ICD-10-CM

## 2022-11-01 PROCEDURE — 76770 US EXAM ABDO BACK WALL COMP: CPT

## 2023-04-21 ENCOUNTER — HOSPITAL ENCOUNTER (OUTPATIENT)
Dept: HOSPITAL 90 - LAB | Age: 68
Discharge: HOME | End: 2023-04-21
Attending: INTERNAL MEDICINE
Payer: COMMERCIAL

## 2023-04-21 DIAGNOSIS — I50.22: Primary | ICD-10-CM

## 2023-04-21 DIAGNOSIS — E78.2: ICD-10-CM

## 2023-04-21 LAB
ALBUMIN SERPL-MCNC: 3 G/DL (ref 3.5–5)
ALT SERPL-CCNC: 42 U/L (ref 12–78)
AST SERPL-CCNC: 38 U/L (ref 10–37)
BASOPHILS NFR BLD AUTO: 0.5 % (ref 0–5)
BUN SERPL-MCNC: 17 MG/DL (ref 7–18)
CHLORIDE SERPL-SCNC: 104 MMOL/L (ref 101–111)
CHOLEST SERPL-MCNC: 178 MG/DL (ref ?–200)
CO2 SERPL-SCNC: 26 MMOL/L (ref 21–32)
CREAT SERPL-MCNC: 1 MG/DL (ref 0.5–1.5)
EOSINOPHIL NFR BLD AUTO: 2.3 % (ref 0–8)
ERYTHROCYTE [DISTWIDTH] IN BLOOD BY AUTOMATED COUNT: 14.3 % (ref 11–15.5)
GFR SERPL CREATININE-BSD FRML MDRD: 81 ML/MIN (ref 90–?)
GLUCOSE SERPL-MCNC: 183 MG/DL (ref 70–105)
HCT VFR BLD AUTO: 41.1 % (ref 42–54)
HDLC SERPL-MCNC: 38 MG/DL (ref 29–71)
LDLC SERPL CALC-MCNC: 118 MG/DL (ref 0–99)
LYMPHOCYTES NFR SPEC AUTO: 20.2 % (ref 21–51)
MCH RBC QN AUTO: 28.6 PG (ref 27–33)
MCHC RBC AUTO-ENTMCNC: 31.9 G/DL (ref 32–36)
MCV RBC AUTO: 89.7 FL (ref 79–99)
MONOCYTES NFR BLD AUTO: 7.4 % (ref 3–13)
NEUTROPHILS NFR BLD AUTO: 69.1 % (ref 40–77)
NRBC BLD MANUAL-RTO: 0 % (ref 0–0.19)
PLATELET # BLD AUTO: 190 K/UL (ref 130–400)
POTASSIUM SERPL-SCNC: 3.9 MMOL/L (ref 3.5–5.1)
PROT SERPL-MCNC: 7 G/DL (ref 6–8.3)
RBC # BLD AUTO: 4.58 MIL/UL (ref 4.5–6.2)
SODIUM SERPL-SCNC: 138 MMOL/L (ref 136–145)
TRIGL SERPL-MCNC: 216 MG/DL (ref 30–200)
WBC # BLD AUTO: 10.8 K/UL (ref 4.8–10.8)

## 2023-04-21 PROCEDURE — 85025 COMPLETE CBC W/AUTO DIFF WBC: CPT

## 2023-04-21 PROCEDURE — 80061 LIPID PANEL: CPT

## 2023-04-21 PROCEDURE — 36415 COLL VENOUS BLD VENIPUNCTURE: CPT

## 2023-04-21 PROCEDURE — 80053 COMPREHEN METABOLIC PANEL: CPT

## 2023-05-05 ENCOUNTER — HOSPITAL ENCOUNTER (OUTPATIENT)
Dept: HOSPITAL 90 - RAH | Age: 68
Discharge: HOME | End: 2023-05-05
Attending: UROLOGY
Payer: COMMERCIAL

## 2023-05-05 DIAGNOSIS — N28.89: ICD-10-CM

## 2023-05-05 DIAGNOSIS — C64.2: Primary | ICD-10-CM

## 2023-05-05 PROCEDURE — 76770 US EXAM ABDO BACK WALL COMP: CPT

## 2024-08-07 ENCOUNTER — HOSPITAL ENCOUNTER (OUTPATIENT)
Dept: HOSPITAL 90 - LAB | Age: 69
Discharge: HOME | End: 2024-08-07
Attending: INTERNAL MEDICINE
Payer: COMMERCIAL

## 2024-08-07 DIAGNOSIS — E11.9: Primary | ICD-10-CM

## 2024-08-07 DIAGNOSIS — I50.22: ICD-10-CM

## 2024-08-07 LAB
ALBUMIN SERPL-MCNC: 3.1 G/DL (ref 3.5–5)
ALT SERPL-CCNC: 53 U/L (ref 12–78)
AST SERPL-CCNC: 40 U/L (ref 10–37)
BILIRUB SERPL-MCNC: 0.3 MG/DL (ref 0.2–1)
BUN SERPL-MCNC: 35 MG/DL (ref 7–18)
CHLORIDE SERPL-SCNC: 107 MMOL/L (ref 101–111)
CO2 SERPL-SCNC: 26 MMOL/L (ref 21–32)
CREAT SERPL-MCNC: 1.4 MG/DL (ref 0.5–1.3)
GFR SERPL CREATININE-BSD FRML MDRD: 54 ML/MIN (ref 90–?)
GLUCOSE SERPL-MCNC: 247 MG/DL (ref 70–105)
POTASSIUM SERPL-SCNC: 4.9 MMOL/L (ref 3.5–5.1)
PROT SERPL-MCNC: 7.2 G/DL (ref 6–8.3)
SODIUM SERPL-SCNC: 141 MMOL/L (ref 136–145)

## 2024-08-07 PROCEDURE — 83880 ASSAY OF NATRIURETIC PEPTIDE: CPT

## 2024-08-07 PROCEDURE — 36415 COLL VENOUS BLD VENIPUNCTURE: CPT

## 2024-08-07 PROCEDURE — 80053 COMPREHEN METABOLIC PANEL: CPT

## 2024-08-12 VITALS — RESPIRATION RATE: 17 BRPM | SYSTOLIC BLOOD PRESSURE: 133 MMHG | DIASTOLIC BLOOD PRESSURE: 62 MMHG | HEART RATE: 60 BPM

## 2024-08-12 LAB
APTT PPP: 25.5 SEC (ref 26.3–35.5)
BASOPHILS # BLD AUTO: 0.06 K/UL (ref 0–0.2)
BASOPHILS NFR BLD AUTO: 0.7 % (ref 0–5)
BUN SERPL-MCNC: 30 MG/DL (ref 7–18)
CHLORIDE SERPL-SCNC: 107 MMOL/L (ref 101–111)
CO2 SERPL-SCNC: 28 MMOL/L (ref 21–32)
CREAT SERPL-MCNC: 1.4 MG/DL (ref 0.5–1.3)
EOSINOPHIL # BLD AUTO: 0.24 K/UL (ref 0–0.7)
EOSINOPHIL NFR BLD AUTO: 2.7 % (ref 0–8)
ERYTHROCYTE [DISTWIDTH] IN BLOOD BY AUTOMATED COUNT: 17.2 % (ref 11–15.5)
GFR SERPL CREATININE-BSD FRML MDRD: 54 ML/MIN (ref 90–?)
GLUCOSE SERPL-MCNC: 187 MG/DL (ref 70–105)
HCT VFR BLD AUTO: 37.7 % (ref 42–54)
IMM GRANULOCYTES # BLD: 0.06 K/UL (ref 0–1)
INR PPP: 0.97 (ref 0.85–1.15)
LYMPHOCYTES # SPEC AUTO: 1.3 K/UL (ref 1–4.8)
LYMPHOCYTES NFR SPEC AUTO: 15 % (ref 21–51)
MCH RBC QN AUTO: 29.3 PG (ref 27–33)
MCHC RBC AUTO-ENTMCNC: 31.6 G/DL (ref 32–36)
MCV RBC AUTO: 92.9 FL (ref 79–99)
MONOCYTES # BLD AUTO: 0.7 K/UL (ref 0.1–1)
MONOCYTES NFR BLD AUTO: 7.9 % (ref 3–13)
NEUTROPHILS # BLD AUTO: 6.6 K/UL (ref 1.8–7.7)
NEUTROPHILS NFR BLD AUTO: 73 % (ref 40–77)
NRBC BLD MANUAL-RTO: 0 % (ref 0–0.19)
PLATELET # BLD AUTO: 158 K/UL (ref 130–400)
POTASSIUM SERPL-SCNC: 4.8 MMOL/L (ref 3.5–5.1)
PROTHROMBIN TIME: 10.5 SEC (ref 9.6–11.6)
RBC # BLD AUTO: 4.06 MIL/UL (ref 4.5–6.2)
SODIUM SERPL-SCNC: 142 MMOL/L (ref 136–145)
WBC # BLD AUTO: 9 K/UL (ref 4.8–10.8)

## 2024-08-14 ENCOUNTER — HOSPITAL ENCOUNTER (OUTPATIENT)
Dept: HOSPITAL 90 - DAH | Age: 69
Discharge: HOME | End: 2024-08-14
Attending: INTERNAL MEDICINE
Payer: COMMERCIAL

## 2024-08-14 VITALS — HEART RATE: 61 BPM | RESPIRATION RATE: 15 BRPM | SYSTOLIC BLOOD PRESSURE: 104 MMHG | DIASTOLIC BLOOD PRESSURE: 38 MMHG

## 2024-08-14 VITALS — DIASTOLIC BLOOD PRESSURE: 52 MMHG | RESPIRATION RATE: 15 BRPM | HEART RATE: 60 BPM | SYSTOLIC BLOOD PRESSURE: 106 MMHG

## 2024-08-14 VITALS — SYSTOLIC BLOOD PRESSURE: 107 MMHG | HEART RATE: 60 BPM | DIASTOLIC BLOOD PRESSURE: 47 MMHG | RESPIRATION RATE: 15 BRPM

## 2024-08-14 VITALS — DIASTOLIC BLOOD PRESSURE: 52 MMHG | HEART RATE: 60 BPM | SYSTOLIC BLOOD PRESSURE: 121 MMHG | RESPIRATION RATE: 15 BRPM

## 2024-08-14 VITALS — HEART RATE: 60 BPM | SYSTOLIC BLOOD PRESSURE: 133 MMHG | DIASTOLIC BLOOD PRESSURE: 47 MMHG | RESPIRATION RATE: 15 BRPM

## 2024-08-14 VITALS — HEART RATE: 60 BPM | RESPIRATION RATE: 15 BRPM | DIASTOLIC BLOOD PRESSURE: 45 MMHG | SYSTOLIC BLOOD PRESSURE: 101 MMHG

## 2024-08-14 DIAGNOSIS — I25.5: ICD-10-CM

## 2024-08-14 DIAGNOSIS — K21.9: ICD-10-CM

## 2024-08-14 DIAGNOSIS — Z95.1: ICD-10-CM

## 2024-08-14 DIAGNOSIS — E11.9: ICD-10-CM

## 2024-08-14 DIAGNOSIS — I25.2: ICD-10-CM

## 2024-08-14 DIAGNOSIS — Z79.01: ICD-10-CM

## 2024-08-14 DIAGNOSIS — Z79.899: ICD-10-CM

## 2024-08-14 DIAGNOSIS — I25.10: ICD-10-CM

## 2024-08-14 DIAGNOSIS — Z45.02: Primary | ICD-10-CM

## 2024-08-14 DIAGNOSIS — E78.5: ICD-10-CM

## 2024-08-14 PROCEDURE — C1721 AICD, DUAL CHAMBER: HCPCS

## 2024-08-14 PROCEDURE — A4221 SUPP NON-INSULIN INF CATH/WK: HCPCS

## 2024-08-14 PROCEDURE — A6258 TRANSPARENT FILM >16<=48 IN: HCPCS

## 2024-08-14 PROCEDURE — A4223 INFUSION SUPPLIES W/O PUMP: HCPCS

## 2024-08-14 PROCEDURE — 85610 PROTHROMBIN TIME: CPT

## 2024-08-14 PROCEDURE — 85730 THROMBOPLASTIN TIME PARTIAL: CPT

## 2024-08-14 PROCEDURE — A4216 STERILE WATER/SALINE, 10 ML: HCPCS

## 2024-08-14 PROCEDURE — 99157 MOD SED OTHER PHYS/QHP EA: CPT

## 2024-08-14 PROCEDURE — A6251 ABSORPT DRG <=16 SQ IN W/O B: HCPCS

## 2024-08-14 PROCEDURE — A4606 OXYGEN PROBE USED W OXIMETER: HCPCS

## 2024-08-14 PROCEDURE — A6206 CONTACT LAYER <= 16 SQ IN: HCPCS

## 2024-08-14 PROCEDURE — 82948 REAGENT STRIP/BLOOD GLUCOSE: CPT

## 2024-08-14 PROCEDURE — 99156 MOD SED OTH PHYS/QHP 5/>YRS: CPT

## 2024-08-14 PROCEDURE — 93005 ELECTROCARDIOGRAM TRACING: CPT

## 2024-08-14 PROCEDURE — 33263 RMVL & RPLCMT DFB GEN 2 LEAD: CPT

## 2024-08-14 PROCEDURE — A4222 INFUSION SUPPLIES WITH PUMP: HCPCS

## 2024-08-14 PROCEDURE — A4215 STERILE NEEDLE: HCPCS

## 2024-08-14 PROCEDURE — 85025 COMPLETE CBC W/AUTO DIFF WBC: CPT

## 2024-08-14 PROCEDURE — A4663 DIALYSIS BLOOD PRESSURE CUFF: HCPCS

## 2024-08-14 PROCEDURE — 36415 COLL VENOUS BLD VENIPUNCTURE: CPT

## 2024-08-14 PROCEDURE — 80048 BASIC METABOLIC PNL TOTAL CA: CPT

## 2024-10-11 ENCOUNTER — HOSPITAL ENCOUNTER (OUTPATIENT)
Dept: HOSPITAL 90 - LAB | Age: 69
Discharge: HOME | End: 2024-10-11
Attending: INTERNAL MEDICINE
Payer: COMMERCIAL

## 2024-10-11 DIAGNOSIS — I50.22: Primary | ICD-10-CM

## 2024-10-11 LAB
BUN SERPL-MCNC: 25 MG/DL (ref 7–18)
CHLORIDE SERPL-SCNC: 104 MMOL/L (ref 101–111)
CO2 SERPL-SCNC: 28 MMOL/L (ref 21–32)
CREAT SERPL-MCNC: 1.2 MG/DL (ref 0.5–1.3)
GFR SERPL CREATININE-BSD FRML MDRD: 65 ML/MIN (ref 90–?)
GLUCOSE SERPL-MCNC: 214 MG/DL (ref 70–105)
MAGNESIUM SERPL-MCNC: 2.2 MG/DL (ref 1.8–2.4)
POTASSIUM SERPL-SCNC: 4.3 MMOL/L (ref 3.5–5.1)
SODIUM SERPL-SCNC: 138 MMOL/L (ref 136–145)

## 2024-10-11 PROCEDURE — 83735 ASSAY OF MAGNESIUM: CPT

## 2024-10-11 PROCEDURE — 83880 ASSAY OF NATRIURETIC PEPTIDE: CPT

## 2024-10-11 PROCEDURE — 36415 COLL VENOUS BLD VENIPUNCTURE: CPT

## 2024-10-11 PROCEDURE — 80048 BASIC METABOLIC PNL TOTAL CA: CPT

## 2024-12-04 ENCOUNTER — HOSPITAL ENCOUNTER (EMERGENCY)
Dept: HOSPITAL 90 - EDH | Age: 69
Discharge: HOME | End: 2024-12-04
Payer: COMMERCIAL

## 2024-12-04 VITALS
RESPIRATION RATE: 20 BRPM | TEMPERATURE: 98.1 F | DIASTOLIC BLOOD PRESSURE: 71 MMHG | OXYGEN SATURATION: 95 % | HEART RATE: 60 BPM | SYSTOLIC BLOOD PRESSURE: 157 MMHG

## 2024-12-04 VITALS — WEIGHT: 149.91 LBS | BODY MASS INDEX: 25.59 KG/M2 | HEIGHT: 64 IN

## 2024-12-04 VITALS — RESPIRATION RATE: 20 BRPM | HEART RATE: 76 BPM

## 2024-12-04 DIAGNOSIS — Z79.899: ICD-10-CM

## 2024-12-04 DIAGNOSIS — Z95.810: ICD-10-CM

## 2024-12-04 DIAGNOSIS — J44.9: ICD-10-CM

## 2024-12-04 DIAGNOSIS — E78.00: ICD-10-CM

## 2024-12-04 DIAGNOSIS — E11.22: ICD-10-CM

## 2024-12-04 DIAGNOSIS — Z89.612: ICD-10-CM

## 2024-12-04 DIAGNOSIS — Z95.1: ICD-10-CM

## 2024-12-04 DIAGNOSIS — Z88.5: ICD-10-CM

## 2024-12-04 DIAGNOSIS — Z89.611: ICD-10-CM

## 2024-12-04 DIAGNOSIS — I13.0: Primary | ICD-10-CM

## 2024-12-04 DIAGNOSIS — I25.10: ICD-10-CM

## 2024-12-04 DIAGNOSIS — N18.30: ICD-10-CM

## 2024-12-04 DIAGNOSIS — Z79.4: ICD-10-CM

## 2024-12-04 DIAGNOSIS — Z20.822: ICD-10-CM

## 2024-12-04 DIAGNOSIS — Z79.02: ICD-10-CM

## 2024-12-04 DIAGNOSIS — E11.65: ICD-10-CM

## 2024-12-04 LAB
APPEARANCE UR: CLEAR
BASOPHILS # BLD AUTO: 0.02 K/UL (ref 0–0.2)
BASOPHILS NFR BLD AUTO: 0.2 % (ref 0–5)
BILIRUB UR QL STRIP: NEGATIVE MG/DL
BNP SERPL-MCNC: 364 PG/ML (ref 0–100)
BUN SERPL-MCNC: 28 MG/DL (ref 7–18)
CHLORIDE SERPL-SCNC: 107 MMOL/L (ref 101–111)
CO2 SERPL-SCNC: 25 MMOL/L (ref 21–32)
COLOR UR: (no result)
CREAT SERPL-MCNC: 1.3 MG/DL (ref 0.5–1.3)
EOSINOPHIL # BLD AUTO: 0.22 K/UL (ref 0–0.7)
EOSINOPHIL NFR BLD AUTO: 2.2 % (ref 0–8)
ERYTHROCYTE [DISTWIDTH] IN BLOOD BY AUTOMATED COUNT: 15.6 % (ref 11–15.5)
GFR SERPL CREATININE-BSD FRML MDRD: 59 ML/MIN (ref 90–?)
GLUCOSE SERPL-MCNC: 181 MG/DL (ref 70–105)
GLUCOSE UR STRIP-MCNC: >=1000 MG/DL
HCT VFR BLD AUTO: 41.1 % (ref 42–54)
HGB UR QL STRIP: NEGATIVE
IMM GRANULOCYTES # BLD: 0.06 K/UL (ref 0–1)
KETONES UR STRIP-MCNC: NEGATIVE MG/DL
LEUKOCYTE ESTERASE UR QL STRIP: NEGATIVE LEU/UL
LYMPHOCYTES # SPEC AUTO: 0.8 K/UL (ref 1–4.8)
LYMPHOCYTES NFR SPEC AUTO: 8.1 % (ref 21–51)
MCH RBC QN AUTO: 28.1 PG (ref 27–33)
MCHC RBC AUTO-ENTMCNC: 31.1 G/DL (ref 32–36)
MCV RBC AUTO: 90.1 FL (ref 79–99)
MICRO URNS: YES
MONOCYTES # BLD AUTO: 0.8 K/UL (ref 0.1–1)
MONOCYTES NFR BLD AUTO: 7.8 % (ref 3–13)
MUCOUS THREADS URNS QL MICRO: (no result) LPF
NEUTROPHILS # BLD AUTO: 8.1 K/UL (ref 1.8–7.7)
NEUTROPHILS NFR BLD AUTO: 81.1 % (ref 40–77)
NITRITE UR QL STRIP: NEGATIVE
NRBC BLD MANUAL-RTO: 0 % (ref 0–0.19)
PH UR STRIP: 5.5 [PH] (ref 5–8)
PLATELET # BLD AUTO: 145 K/UL (ref 130–400)
POTASSIUM SERPL-SCNC: 4.1 MMOL/L (ref 3.5–5.1)
PROT UR QL STRIP: 70 MG/DL
RBC # BLD AUTO: 4.56 MIL/UL (ref 4.5–6.2)
RBC #/AREA URNS HPF: (no result) /HPF (ref 0–1)
SODIUM SERPL-SCNC: 140 MMOL/L (ref 136–145)
SP GR UR STRIP: 1.03 (ref 1–1.03)
UROBILINOGEN UR STRIP-MCNC: 0.2 MG/DL (ref 0.2–1)
WBC # BLD AUTO: 9.9 K/UL (ref 4.8–10.8)
WBC #/AREA URNS HPF: (no result) /HPF (ref 0–1)
WBC MORPH BLD: (no result)

## 2024-12-04 PROCEDURE — 85025 COMPLETE CBC W/AUTO DIFF WBC: CPT

## 2024-12-04 PROCEDURE — 87426 SARSCOV CORONAVIRUS AG IA: CPT

## 2024-12-04 PROCEDURE — 36415 COLL VENOUS BLD VENIPUNCTURE: CPT

## 2024-12-04 PROCEDURE — 81001 URINALYSIS AUTO W/SCOPE: CPT

## 2024-12-04 PROCEDURE — 84484 ASSAY OF TROPONIN QUANT: CPT

## 2024-12-04 PROCEDURE — 94640 AIRWAY INHALATION TREATMENT: CPT

## 2024-12-04 PROCEDURE — 83880 ASSAY OF NATRIURETIC PEPTIDE: CPT

## 2024-12-04 PROCEDURE — 96374 THER/PROPH/DIAG INJ IV PUSH: CPT

## 2024-12-04 PROCEDURE — 99285 EMERGENCY DEPT VISIT HI MDM: CPT

## 2024-12-04 PROCEDURE — 71045 X-RAY EXAM CHEST 1 VIEW: CPT

## 2024-12-04 PROCEDURE — 80048 BASIC METABOLIC PNL TOTAL CA: CPT

## 2024-12-04 PROCEDURE — 93005 ELECTROCARDIOGRAM TRACING: CPT

## 2024-12-04 PROCEDURE — 96375 TX/PRO/DX INJ NEW DRUG ADDON: CPT

## 2024-12-04 NOTE — HMCIMG
CHEST 1VW



HISTORY: Shortness of breath



COMPARISON: 2/22/2004



FINDINGS: A frontal projection of the chest was obtained. Prominent

interstitial markings are seen with possible superimposed infiltrates. 

Poststernotomy changes are seen. The heart is enlarged. Degenerative

changes of the thoracolumbar spine are present.  Pacemaker is seen

entering from the left.  No evidence of aortic calcification is seen.



IMPRESSION:

1. Prominent interstitial markings are seen with possible superimposed

infiltrates.

## 2024-12-04 NOTE — EKG
[facility]

                                       

Test Date:    2024               Test Time:    11:01:51

Pat Name:     CATRACHITA SIMENTAL        Department:   [facility]

Patient ID:   [id number]           Room:          

Gender:       M                        Technician:   [id number]

:          1955               Requested By: OSMANY GERMAN

Order Number: [id number]        Reading MD:   Hua Tubbs

                                 Measurements

Intervals                              Axis          

Rate:         75                       P:            -39

OR:           174                      QRS:          58

QRSD:         121                      T:            268

QT:           376                                    

QTc:          421                                    

                           Interpretive Statements

Sinus rhythm

Possible inferior infarct age indeterminate

Nonspecific intraventricular conduction delay

Compared to ECG 2024 10:11:20

Ectopic atrial rhythm no longer present

Electronically Signed On 2024 17:13:29 CST by Hua Tubbs



Please click the below link to view image of tracing.

## 2024-12-04 NOTE — NUR
PATIENT EDUCATION PROVIDED ON DISCHARGE INSTRUCTIONS AND ABX TREATMENT. 
PATIENTS FAMILY VERBALIZES UNDERSTANDING AND REQUESTED TRANSFER HOME VIA EMS.

## 2024-12-04 NOTE — NUR
PATIENTS FAMILY REQUESTED TO SPEAK WITH CHARGE NURSE; NP RHEINER SPOKE WITH 
FAMILY; PATIENT VERBALIZES FEELING WELL. EMS TO TRANSFER PATIENT HOME. 
INTERVENTIONS PROVIDED EXPLAINED TO FAMILY.

## 2024-12-04 NOTE — ERN
ED Note


History of Present Illness


Stated Complaint:  WEAKNESS


Chief Complaint:  Weakness


Time Seen by MD:  10:42


Dictation:


PATIENT IS A 69-YEAR-OLD MALE COMING IN VIA EMS FROM HOME WITH COMPLAINTS OF 

HAVING MILD SHORTNESS A BREATH FOR THE LAST 2-3 DAYS WITH GENERALIZED BODY 

WEAKNESS.  HE STATES THE WEAKNESS HAS GOTTEN PROGRESSIVELY WORSE.  HE DENIES 

FEVER CHILLS NAUSEA VOMITING NO ABDOMINAL PAIN.  HE IS A BILATERAL AMPUTEE LOWER

EXTREMITIES, HAS A HISTORY OF COPD AND USES A NEBULIZER AT HOME.  HE STATES HE 

HAS HAD A NONPRODUCTIVE COUGH.  PER EMS, HE WAS SATTING 91-92% ON ROOM AIR AT 

HOME.  HE DOES NOT USE HOME O2 CONTINUOUSLY


Allergies:  


Coded Allergies:  


     hydromorphone (Unverified  Allergy, Unknown, 6/12/21)


     meperidine (Unverified  Allergy, Unknown, 9/16/17)


   HYPOTENSION


     oxycodone (Unverified  Allergy, Unknown, SLURRED SPEECH, 6/15/22)


Home Meds


Active Scripts


Tramadol Hcl (Tramadol HCl) 50 Mg Tablet, 50 MG PO Q6HPRN PRN for PAIN, #15 TAB 

0 Refills


   Prov:CAITLIN GARCIA MD         8/14/24


Clopidogrel Bisulfate (Plavix) 75 Mg Tablet, 75 MG PO DAILY, #30 TAB


   Prov:ROSALINA LOZADA [facility]         4/14/22


Reported Medications


Sacubitril/Valsartan (Entresto 24 mg-26 mg Tablet) 24 Mg-26 Mg Tablet, 1 EACH PO

BID, TAB


   8/12/24


Alendronate Sodium (Alendronate Sodium) 70 Mg Tablet, 70 MG PO QWEEK, TAB


   8/12/24


Dapagliflozin Propanediol (Farxiga) 10 Mg Tablet, 10 MG PO AM, TAB


   8/12/24


Acetaminophen (Tylenol Extra Strength) 500 Mg Tablet, 1 TAB PO Q6HPRN PRN for 

MILD PAIN (1-3), TAB


   2/23/24


Insulin Glargine,Hum.rec.anlog (Toujeo Max Solostar) 300 Unit/Ml (3 Ml) 

Insuln.pen, 30 UNITS SQ DAILY


   2/23/24


Fluticasone/Umeclidin/Vilanter (Trelegy Ellipta 100-62.5-25) 100-62.5 

Blst.w.dev, 1 PUFF IH DAILY


   2/23/24


Rosuvastatin Calcium (Rosuvastatin Calcium) 20 Mg Tablet, 20 MG PO HS, TAB


   1/7/24


Amiodarone HCl (Amiodarone HCl) 200 Mg Tablet, 200 MG PO DAILY, TAB


   1/7/24


Glipizide (Glipizide) 10 Mg Tablet, 10 MG PO BIDMEALS, TAB


   1/7/24


Pantoprazole Sodium (Pantoprazole Sodium) 40 Mg Tablet.dr, 40 MG PO DAILY, TAB


   1/7/24


Gabapentin (Neurontin) 300 Mg Capsule, 300 MG PO BID, CAP


   1/7/24


Folic Acid (Folic Acid) 0.4 Mg Tablet, 1 MG PO DAILY, TAB


   6/12/21


Carvedilol (Carvedilol) 3.125 Mg Tablet, 3.125 MG PO BID, TAB


   6/8/21


Magnesium Oxide (Magnesium) 400 Mg Tablet, 400 MG PO DAILY, TAB


   6/8/21





Past Medical History


Past Medical History:  CAD, COPD, Diabetes-Type II, High Cholesterol, 

Hypertension, MI


Additional Past Medical Hx:  ONLY ONE KIDNEY,PVD


Surgical History:  CABG, Pacer/AICD


Surgical History Other:  BILATERAL AKA


Social History:  Negative, Lives with family, Other


RN Note Reviewed/Agreed w/PFSH:  Yes





Review of System


Dictation


CONSTITUTIONAL:  NEGATIVE EXCEPT FOR HPI WEAKNESS


HEAD/FACE:  NEGATIVE EXCEPT FOR HPI


EENT:  NEGATIVE EXCEPT FOR HPI


RESPIRATORY: NEGATIVE EXCEPT FOR HPI SOB WITH COUGH


GASTROINTESTINAL/ABDOMINAL:   NEGATIVE EXCEPT FOR HPI


GENITOURINARY: NEGATIVE EXCEPT FOR HPI


MUSCULOSKELETAL: NEGATIVE EXCEPT FOR HPI


INTEGUMENTARY:  NEGATIVE EXCEPT FOR HPI


NEUROLOGICAL/PSYCH: NEGATIVE EXCEPT FOR HPI


HEMATOLOGIC/LYMPHATIC:  NEGATIVE EXCEPT FOR HPI





ALL SYSTEMS NEGATIVE, EXCEPT AS NOTED ABOVE.





13 POINT REVIEW OF SYSTEMS ASSESSED AND ALL NEGATIVE EXCEPT FOR ABOVE.





Initial Vital Sign


VS





                                   Vital Signs








  Date Time  Temp Pulse Resp B/P (MAP) Pulse Ox O2 Delivery O2 Flow Rate FiO2


 


12/4/24 10:34 98.2 67 18 128/73 95 Nasal Cannula 2.0 


 


12/4/24 10:57        21











Physical Exam


Dictation


VITAL SIGNS REVIEWED 


GENERAL APPEARANCE: ALERT, ORIENTED X 3, NO ACUTE DISTRESS, WELL DEVELOPED, 

NOURISHED. 


HEAD AND FACE: NON-TRAUMATIC.


EYES: PERRL, PINK CONJUNCTIVAS, EYELID NO TRAUMA, ANTERIOR CHAMBER WITH ARCUS 

SENILIS. 


EARS: PINNAS INTACT AND NO SIGNS OF TRAUMA OR ERYTHEMA EAR CANALS CLEAR AND NO 

DISCHARGE TM NO ERYTHEMA 


NOSE: NO DISCHARGE, NO BLEEDING. 


OROPHARYNX: MOUTH NORMAL, TONGUE PINK, 


PHARYNX CLEAR,NO ERYTHEMA, TONSILS NO EXUDATES, NO ABSCESSES NOTED,  MUCOUS 

MEMBRANE MOIST 


NECK: SUPPLE, NON-TENDER, NO THYROMEGALY, NO MASSES, NO JVD, NO BRUITS 


BREAST:DEFERRED 


CHEST:NO TENDERNESS, NO CREPITUS, NO PARADOXICAL MOVEMENT, NO RETRACTIONS 


LUNGS:CLEAR, WELL-VENTILATED, SYMMETRIC, NO RALES, NO WHEEZING, NO RHONCHI, NO 

STRIDOR, BILATERAL BREATH SOUNDS CLEAR TO AUSCULTATION/DIMINISHED IN THE BASE


HEART: REGULAR RATE, REGULAR RHYTHM, NO MURMUR, NO GALLOPS 


VASCULAR: NO PERIPHERAL EDEMA, 


ABDOMEN: SOFT, POSITIVE BOWEL SOUNDS, NONDISTENDED, NO GUARDING, 


NONTENDER, NO REBOUND, NO MASSES NO HEPATOMEGALY, NO SPLENOMEGALY, NO LABOY'S 

SIGN, NO HERNIAS.


RECTAL: DEFERRED


GENITAL: DEFERRED


NEUROLOGICAL: NORMAL SPEECH,  MOTOR FUNCTION INTACT, SENSORY FUNCTION INTACT 


MUSCULOSKELETAL: NECK NONTENDER, FULL RANGE OF MOTION, BACK NONTENDER, FULL 

RANGE OF MOTION,


EXTREMITIES:  BILATERAL AKA


SKIN: COLOR PINK, DRY, NO TURGOR, NO RASH, NO LACERATIONS, NO ABRASIONS, NO 

CONTUSIONS.


LYMPHATIC: DEFERRED





Results (Laboratory/Radiology)


Laboratory/Radiology





Laboratory Tests








Test


 12/4/24


11:01 12/4/24


12:27 12/4/24


12:45


 


White Blood Count


 9.9 K/uL


(4.8-10.8) 


 





 


Red Blood Count


 4.56 MIL/uL


(4.50-6.20) 


 





 


Hemoglobin


 12.8 g/dL


(14.0-18.0)  L 


 





 


Hematocrit


 41.1 % (42-54)


L 


 





 


Mean Corpuscular Volume


 90.1 fL


(79-99) 


 





 


Mean Corpuscular Hemoglobin


 28.1 pg


(27.0-33.0) 


 





 


Mean Corpuscular Hemoglobin


Concent 31.1 g/dL


(32.0-36.0)  L 


 





 


Red Cell Distribution Width


 15.6 %


(11.0-15.5)  H 


 





 


Platelet Count


 145 K/uL


(130-400) 


 





 


Mean Platelet Volume


 12.0 fL


(7.5-10.5)  H 


 





 


Immature Granulocyte % (Auto) 0.6 % (0-1)    


 


Neutrophils (%) (Auto)


 81.1 %


(40.0-77.0)  H 


 





 


Lymphocytes (%) (Auto)


 8.1 %


(21.0-51.0)  L 


 





 


Monocytes (%) (Auto)


 7.8 %


(3.0-13.0) 


 





 


Eosinophils (%) (Auto)


 2.2 %


(0.0-8.0) 


 





 


Basophils (%) (Auto)


 0.2 %


(0.0-5.0) 


 





 


Neutrophils # (Auto)


 8.1 K/uL


(1.8-7.7)  H 


 





 


Lymphocytes # (Auto)


 0.8 K/uL


(1.0-4.8)  L 


 





 


Monocytes # (Auto)


 0.8 K/uL


(0.1-1.0) 


 





 


Eosinophils # (Auto)


 0.22 K/uL


(0.00-0.70) 


 





 


Basophils # (Auto)


 0.02 K/uL


(0.00-0.20) 


 





 


Absolute Immature Granulocyte


(auto 0.06 K/uL


(0-1) 


 





 


Nucleated Red Blood Cells


 0.0 %


(0.0-0.19) 


 





 


White Cell Morphology Comment See comments    


 


Sodium Level


 140 mmol/L


(136-145) 


 





 


Potassium Level


 4.1 mmol/L


(3.5-5.1) 


 





 


Chloride Level


 107 mmol/L


(101-111) 


 





 


Carbon Dioxide Level


 25 mmol/L


(21-32) 


 





 


Blood Urea Nitrogen


 28 mg/dL


(7-18)  H 


 





 


Creatinine


 1.3 mg/dL


(0.5-1.3) 


 





 


Glomerular Filtration Rate


Calc 59 mL/min


(>90) 


 





 


Random Glucose


 181 mg/dL


()  H 


 





 


Total Calcium


 8.6 mg/dL


(8.5-10.1) 


 





 


Troponin I High Sensitivity


 17 ng/L (4-75)


 


 





 


B-Type Natriuretic Peptide


 364 pg/mL


(0-100)  H 


 





 


SARS-CoV-2 Antigen (Rapid)


 


 PRESUMPTIVE


NEGATIVE 





 


Urine Color


 


 


 LIGHT-YELLOW


(YELLOW)


 


Urine Appearance   CLEAR (CLEAR)  


 


Urine pH   5.5 (5.0-8.0)  


 


Urine Specific Gravity


 


 


 1.026


(1.001-1.031)


 


Urine Protein


 


 


 70 mg/dL


(NEGATIVE)  H


 


Urine Glucose (UA)


 


 


 >=1000 mg/dL


(NEGATIVE)  H


 


Urine Ketones


 


 


 NEGATIVE mg/dL


(NEGATIVE)


 


Urine Occult Blood


 


 


 NEGATIVE


(NEGATIVE)


 


Urine Nitrate


 


 


 NEGATIVE


(NEGATIVE)


 


Urine Bilirubin


 


 


 NEGATIVE mg/dL


(NEGATIVE)


 


Urine Urobilinogen


 


 


 0.2 mg/dL


(0.2-1.0)


 


Urine Leukocyte Esterase


 


 


 NEGATIVE


Karlene/uL


 


Urine RBC


 


 


 2-5 /HPF (0-1)


H


 


Urine WBC


 


 


 0-1 /HPF (0-1)





 


Urine Bacteria


 


 


 None /HPF


(None Seen)








Labs Reviewed?:  Yes


EKG Comment:


EKG sinus rhythm/heart rate 75/axis normal/nonspecific intraventricular 

conduction delay





ED Course


ED Course





Orders








Procedure Category Date Status





  Time 


 


Methylprednisolone PHA 12/4/24 Complete





Succ 125mg (Solu-Medr  11:00 


 


Ipratropium/Albuterol PHA 12/4/24 Complete





Neb (Duoneb)  11:00 


 


Covid19 (Sars Antigen LAB 12/4/24 Complete





Rapid)  10:42 


 


Cbc With Differential LAB 12/4/24 Complete





  10:42 


 


B-Type Natriuretic LAB 12/4/24 Complete





Peptide  10:42 


 


Chest 1vw RAD 12/4/24 Resulted





  10:42 


 


12 Lead Ekg Tracing- EKG 12/4/24 Complete





Technical  10:42 


 


Troponin I High LAB 12/4/24 Complete





Sensitivity  10:42 


 


Basic Metabolic Panel LAB 12/4/24 Complete





  10:42 


 


Urinalysis Profile LAB 12/4/24 Complete





  10:43 


 


Furosemide 40mg Vial PHA 12/4/24 Complete





 (Lasix 40mg Vial)  13:00 








Current Medications








 Medications


  (Trade)  Dose


 Ordered  Sig/Geo


 Route


 PRN Reason  Start Time


 Stop Time Status Last Admin


Dose Admin


 


 Albuterol


  (DUOneb)  1 udvial  ONCE  ONCE


 IH


   12/4/24 11:00


 12/4/24 11:01 DC 12/4/24 10:55





 


 Furosemide


  (LASix 40MG VIAL)  40 mg  ONCE  ONCE


 IV


   12/4/24 13:00


 12/4/24 13:01 DC 12/4/24 14:19





 


 Methylprednisolone


 Sodium Succinate


  (Solu-medROL


 125MG)  125 mg  ONCE  ONCE


 IVP


   12/4/24 11:00


 12/4/24 11:01 DC 12/4/24 11:30











Vital Signs








  Date Time  Temp Pulse Resp B/P (MAP) Pulse Ox O2 Delivery O2 Flow Rate FiO2


 


12/4/24 14:00 98.1 62 20 150/61 95 Nasal Cannula* 2.0 N/A


 


12/4/24 13:00 98.1 70 20 151/60 95 Nasal Cannula* 2.0 N/A


 


12/4/24 12:28 98.1 70 20 138/60 95 Nasal Cannula* 2 28


 


12/4/24 10:58  76 20     


 


12/4/24 10:57 99.9 85 16 141/53 98 Room Air* 0 21


 


12/4/24 10:34 98.2 67 18 128/73 95 Nasal Cannula 2.0 





Fourteen 55, discussed case with patient at length does not want to be admitted 

to the hospital states he feels better now and wishes to go home.  We will be 

discharged home to follow up with his primary care doctor in 1-2 days and to 

continue his medications including his nebulizer at home.





Medical Decision Making


MDM


MDM: 


Differential diagnosis:  ACS/AMI/SARs COVID/bronchitis/pneumonia/fluid 

overload/electrolyte imbalance/UTI


Rationale: Tests considered and ordered secondary to shared decision making incl

ude:  EKG/labs/radiology


Previous outside records reviewed: Old ER visits.  Reviewed


Risk of complication and/or morbidity or mortality of patient management: None


Medications-Per medication reconciliation see nurse's notes


Need for hospitalization: Patient does not meet criteria for hospitalization.  

None


Need for emergency major/minor surgery: No





There are no social concerns with this patient.





Prescription drug management none


Prescriptions will include symptomatic care





Patient's prior external medical records from other ER visits were reviewed by 

me as indicated.  Prior testing and results from previous visits were reviewed. 

Prior tests were taken into account with medical decision making and resource 

utilization, independent historian/historians were used to obtain complete 

medical history.





I independently interpreted the test that were performed, results were reviewed 

by me and considered findings on radiology if ordered.





Medical management and examination interpretation discussions were had by me 

with other qualified healthcare professionals as indicated for the patient's 

care.





DX & DISP


Disposition:  Discharge


Departure


Impression:  


   Primary Impression:  CHF exacerbation


   Additional Impressions:  COPD (chronic obstructive pulmonary disease), Stage 

   III chronic kidney disease, Hyperglycemia


Condition:  Stable


Scripts


Levofloxacin (Levofloxacin) 500 Mg Tablet


1 TAB PO DAILY for 10 Days, #10 TAB 0 Refills


   Prov: OSMANY GERMAN NP         12/4/24





Additional Instructions:  


Follow-up with primary care provider in 1 to 2 days.  Take medications as 

directed here in the emergency room.  Okay to continue home medications unless 

otherwise discussed during your visit in the emergency room today.  Return to 

your nearest emergency room if symptoms worsen or if there is no improvement.  

Call 911 if you need immediate assistance.  Take Tylenol or Motrin 

over-the-counter as needed and if no contraindications are present.  Increase 

oral hydration.  A wound culture or urine culture was ordered here in the 

emergency room department please follow-up with primary care provider and advise

them to get repeat ports from our facility.  If you had any Ace wrap/splints 

that were applied here, please do not remove them until you see your primary 

care or specialty.





Continue all medications and treatments at home.  Follow up with your primary 

care doctor in 1-2 days.  Take antibiotics as directed until gone.


Referrals:  


MIMA MORRISON MD (PCP)


Time of Disposition:  14:59


I have reviewed the case, and I agree with, Diagnosis and Plan











OSMANY GERMAN NP               Dec 4, 2024 10:46

## 2025-04-24 ENCOUNTER — HOSPITAL ENCOUNTER (OUTPATIENT)
Dept: HOSPITAL 90 - LAB | Age: 70
Discharge: HOME | End: 2025-04-24
Attending: INTERNAL MEDICINE
Payer: COMMERCIAL

## 2025-04-24 DIAGNOSIS — I50.22: Primary | ICD-10-CM

## 2025-04-24 PROCEDURE — 83880 ASSAY OF NATRIURETIC PEPTIDE: CPT

## 2025-04-24 PROCEDURE — 36415 COLL VENOUS BLD VENIPUNCTURE: CPT

## 2025-04-24 PROCEDURE — 80048 BASIC METABOLIC PNL TOTAL CA: CPT
